# Patient Record
Sex: FEMALE | Race: WHITE | NOT HISPANIC OR LATINO | Employment: FULL TIME | URBAN - NONMETROPOLITAN AREA
[De-identification: names, ages, dates, MRNs, and addresses within clinical notes are randomized per-mention and may not be internally consistent; named-entity substitution may affect disease eponyms.]

---

## 2023-05-29 DIAGNOSIS — J45.40 MODERATE PERSISTENT ASTHMA, UNSPECIFIED WHETHER COMPLICATED (HHS-HCC): Primary | ICD-10-CM

## 2023-05-30 PROBLEM — R07.2 PRECORDIAL CATCH SYNDROME: Status: ACTIVE | Noted: 2023-05-30

## 2023-05-30 PROBLEM — R91.8 PULMONARY NODULES: Status: ACTIVE | Noted: 2023-05-30

## 2023-05-30 PROBLEM — L71.9 ROSACEA: Status: ACTIVE | Noted: 2023-05-30

## 2023-05-30 PROBLEM — R74.01 ELEVATED AST (SGOT): Status: ACTIVE | Noted: 2023-05-30

## 2023-05-30 PROBLEM — J30.1 ALLERGIC RHINITIS DUE TO POLLEN: Status: ACTIVE | Noted: 2023-05-30

## 2023-05-30 PROBLEM — J30.9 ALLERGIC RHINITIS: Status: ACTIVE | Noted: 2023-05-30

## 2023-05-30 PROBLEM — J45.909 ASTHMA (HHS-HCC): Status: ACTIVE | Noted: 2023-05-30

## 2023-05-30 PROBLEM — J30.81 ALLERGIC RHINITIS DUE TO CATS: Status: ACTIVE | Noted: 2023-05-30

## 2023-05-30 RX ORDER — FLUTICASONE PROPIONATE 50 MCG
1 SPRAY, SUSPENSION (ML) NASAL DAILY
COMMUNITY

## 2023-05-30 RX ORDER — MOMETASONE FUROATE AND FORMOTEROL FUMARATE DIHYDRATE 100; 5 UG/1; UG/1
2 AEROSOL RESPIRATORY (INHALATION)
COMMUNITY
Start: 2022-04-13 | End: 2023-05-30 | Stop reason: SDUPTHER

## 2023-05-30 RX ORDER — ALBUTEROL SULFATE 90 UG/1
1 AEROSOL, METERED RESPIRATORY (INHALATION) EVERY 4 HOURS PRN
COMMUNITY
End: 2023-10-23 | Stop reason: WASHOUT

## 2023-05-30 RX ORDER — MOMETASONE FUROATE AND FORMOTEROL FUMARATE DIHYDRATE 100; 5 UG/1; UG/1
AEROSOL RESPIRATORY (INHALATION)
Qty: 39 G | Refills: 3 | Status: SHIPPED | OUTPATIENT
Start: 2023-05-30

## 2023-08-29 ENCOUNTER — TELEPHONE (OUTPATIENT)
Dept: PRIMARY CARE | Facility: CLINIC | Age: 58
End: 2023-08-29
Payer: COMMERCIAL

## 2023-08-29 DIAGNOSIS — Z00.00 HEALTHCARE MAINTENANCE: Primary | ICD-10-CM

## 2023-10-09 ENCOUNTER — LAB (OUTPATIENT)
Dept: LAB | Facility: LAB | Age: 58
End: 2023-10-09
Payer: COMMERCIAL

## 2023-10-09 DIAGNOSIS — Z00.00 HEALTHCARE MAINTENANCE: ICD-10-CM

## 2023-10-09 LAB
ALBUMIN SERPL BCP-MCNC: 4.6 G/DL (ref 3.4–5)
ALP SERPL-CCNC: 44 U/L (ref 33–110)
ALT SERPL W P-5'-P-CCNC: 13 U/L (ref 7–45)
ANION GAP SERPL CALC-SCNC: 10 MMOL/L (ref 10–20)
AST SERPL W P-5'-P-CCNC: 48 U/L (ref 9–39)
BILIRUB SERPL-MCNC: 0.5 MG/DL (ref 0–1.2)
BUN SERPL-MCNC: 15 MG/DL (ref 6–23)
CALCIUM SERPL-MCNC: 9.7 MG/DL (ref 8.6–10.6)
CHLORIDE SERPL-SCNC: 106 MMOL/L (ref 98–107)
CHOLEST SERPL-MCNC: 173 MG/DL (ref 0–199)
CHOLESTEROL/HDL RATIO: 2.4
CO2 SERPL-SCNC: 32 MMOL/L (ref 21–32)
CREAT SERPL-MCNC: 0.82 MG/DL (ref 0.5–1.05)
ERYTHROCYTE [DISTWIDTH] IN BLOOD BY AUTOMATED COUNT: 12.1 % (ref 11.5–14.5)
GFR SERPL CREATININE-BSD FRML MDRD: 84 ML/MIN/1.73M*2
GLUCOSE SERPL-MCNC: 83 MG/DL (ref 74–99)
HCT VFR BLD AUTO: 40.9 % (ref 36–46)
HDLC SERPL-MCNC: 71.2 MG/DL
HGB BLD-MCNC: 13.2 G/DL (ref 12–16)
LDLC SERPL CALC-MCNC: 93 MG/DL (ref 140–190)
MCH RBC QN AUTO: 30.6 PG (ref 26–34)
MCHC RBC AUTO-ENTMCNC: 32.3 G/DL (ref 32–36)
MCV RBC AUTO: 95 FL (ref 80–100)
NON HDL CHOLESTEROL: 102 MG/DL (ref 0–149)
NRBC BLD-RTO: 0 /100 WBCS (ref 0–0)
PLATELET # BLD AUTO: 194 X10*3/UL (ref 150–450)
PMV BLD AUTO: 11.1 FL (ref 7.5–11.5)
POTASSIUM SERPL-SCNC: 4 MMOL/L (ref 3.5–5.3)
PROT SERPL-MCNC: 7.2 G/DL (ref 6.4–8.2)
RBC # BLD AUTO: 4.32 X10*6/UL (ref 4–5.2)
SODIUM SERPL-SCNC: 144 MMOL/L (ref 136–145)
TRIGL SERPL-MCNC: 44 MG/DL (ref 0–149)
VLDL: 9 MG/DL (ref 0–40)
WBC # BLD AUTO: 4.5 X10*3/UL (ref 4.4–11.3)

## 2023-10-09 PROCEDURE — 80053 COMPREHEN METABOLIC PANEL: CPT

## 2023-10-09 PROCEDURE — 80061 LIPID PANEL: CPT

## 2023-10-09 PROCEDURE — 85027 COMPLETE CBC AUTOMATED: CPT

## 2023-10-09 PROCEDURE — 36415 COLL VENOUS BLD VENIPUNCTURE: CPT

## 2023-10-23 ENCOUNTER — OFFICE VISIT (OUTPATIENT)
Dept: PRIMARY CARE | Facility: CLINIC | Age: 58
End: 2023-10-23
Payer: COMMERCIAL

## 2023-10-23 VITALS
HEART RATE: 59 BPM | OXYGEN SATURATION: 99 % | RESPIRATION RATE: 14 BRPM | WEIGHT: 124.2 LBS | BODY MASS INDEX: 19.96 KG/M2 | HEIGHT: 66 IN | TEMPERATURE: 98.1 F | SYSTOLIC BLOOD PRESSURE: 113 MMHG | DIASTOLIC BLOOD PRESSURE: 66 MMHG

## 2023-10-23 DIAGNOSIS — Z00.00 HEALTHCARE MAINTENANCE: Primary | ICD-10-CM

## 2023-10-23 DIAGNOSIS — J45.909 ASTHMA, UNSPECIFIED ASTHMA SEVERITY, UNSPECIFIED WHETHER COMPLICATED, UNSPECIFIED WHETHER PERSISTENT (HHS-HCC): ICD-10-CM

## 2023-10-23 DIAGNOSIS — Z12.31 ENCOUNTER FOR SCREENING MAMMOGRAM FOR MALIGNANT NEOPLASM OF BREAST: ICD-10-CM

## 2023-10-23 DIAGNOSIS — R74.01 ELEVATED AST (SGOT): ICD-10-CM

## 2023-10-23 DIAGNOSIS — M25.552 HIP PAIN, LEFT: ICD-10-CM

## 2023-10-23 DIAGNOSIS — I83.93 VARICOSE VEINS OF BOTH LOWER EXTREMITIES, UNSPECIFIED WHETHER COMPLICATED: ICD-10-CM

## 2023-10-23 DIAGNOSIS — Z23 IMMUNIZATION DUE: ICD-10-CM

## 2023-10-23 PROCEDURE — 90471 IMMUNIZATION ADMIN: CPT | Performed by: FAMILY MEDICINE

## 2023-10-23 PROCEDURE — 99213 OFFICE O/P EST LOW 20 MIN: CPT | Performed by: FAMILY MEDICINE

## 2023-10-23 PROCEDURE — 1036F TOBACCO NON-USER: CPT | Performed by: FAMILY MEDICINE

## 2023-10-23 PROCEDURE — 99396 PREV VISIT EST AGE 40-64: CPT | Performed by: FAMILY MEDICINE

## 2023-10-23 PROCEDURE — 3008F BODY MASS INDEX DOCD: CPT | Performed by: FAMILY MEDICINE

## 2023-10-23 PROCEDURE — 90686 IIV4 VACC NO PRSV 0.5 ML IM: CPT | Performed by: FAMILY MEDICINE

## 2023-10-23 RX ORDER — ALBUTEROL SULFATE 90 UG/1
2 AEROSOL, METERED RESPIRATORY (INHALATION)
COMMUNITY

## 2023-10-23 ASSESSMENT — LIFESTYLE VARIABLES: HOW OFTEN DO YOU HAVE A DRINK CONTAINING ALCOHOL: 2-4 TIMES A MONTH

## 2023-10-23 NOTE — ASSESSMENT & PLAN NOTE
Varicosities but no edema on exam.  Wears compression stockings with travel, may consider wearing on daily basis for preventing progression of varicose veins.

## 2023-10-23 NOTE — ASSESSMENT & PLAN NOTE
Her asthma has historically done well with the Dulera  She states that she has not needed the albuterol in quite some time

## 2023-10-23 NOTE — ASSESSMENT & PLAN NOTE
AST mildly elevated in past but remaining LFTs normal.  AST elevated starting in 2019 at 40, highest 50.  No fatty liver noted on prior CAT scan imaging or US gallbladder (2016).  No FMHx of liver disease  EtOH rare, infrequent.  No chronic Tylenol usage.    10/2023 AST 48  11/2022 AST 47   12/2021 AST 50  09/2020 AST 40  05/2019 AST 40    Patient reassured that AST is a serum test and can come from other sources than just the liver. At this level of very mild elevation that has been stable for years now and prior imaging that showed liver normal this is not concerning, patient reassured. Will continue to monitor CMP annually.

## 2023-10-23 NOTE — PROGRESS NOTES
Subjective   Patient ID: Fatimah Arriola is a 58 y.o. female who presents for Annual Exam (Pt presents for annual physical exam- ABN given to pt and signed, pt verbalized understanding. Pt states that she would like to discuss her AST. Stiffness in her left hip pain down into her upper thigh sitting, laying, for a long times- pt states that this has been going on for about 6 months or more).    HPI     Patient presents today for annual physical.    Patient states that she would like to discuss her AST, see below.    She would also like to discuss stiffness in her left hip pain radiating down into her upper thigh. Occurs with sitting and laying for prolonged periods. Describes as heaviness. Occasionally has pain with walking. Ongoing for 6+ months. States she has slowed down exercising due to discomfort, cannot lay or sleep on left side in bed. Every once and while has pain free day. Home treatments include stretching, disinclined to take medications. She denies any prior trauma or injury.    WELLNESS VISIT   TDAP: 2019  SHINGRIX: none found - DUE  PNEUMOVAX: N/A  PAP: 3/2023 (ASCUS negative, HPV negative) - done via GYN (Dr. Pryor)  MAMMO: 10/2022 (FMHX of sister (dx 56 y/o), cousin, m. Grandmother)  CSCOPE: 10/2017 (no specimens, diverticulosis, hemorrhoids, good prep) (No FMHx)  DEXA: N/A  HEP C SCREEN: 10/2017 negative  CACS: 0 (low) in 2/2020   LIPID: 10/2023 TC/HDL 2.4, HDL 71, LDL 93, TRIG 44    Diet: overall balanced    Exercise: 3 days per week, walking x 1 day, bands x 1 day, weights x 1 day    Alcohol use: sometimes, may be 1 beer or glass of wine per week    Smoking: never smoker    Dental visits: UTD  Vision screening: UTD    Patient is agreeable to influenza vaccine.    Cervical cancer screening: UTD via GYN - March 2023 negative.  Post-menopausal, onset of menopause age 53.  No history of abnormal pap smears.  Denies family history in first degree relative.  Denies pelvic pain, vaginal discharge, or  "vaginal bleeding.    Breast cancer screening: DUE  Reports family history in first degree relative and multiple second degree relatives, sister, cousin, m. Grandmother.  Denies lumps/bumps, skin changes, nipple retraction, or nipple drainage.    Colon cancer screening: UTD  Denies family history in first degree relative.  Denies melena, hematochezia, constipation, diarrhea, bloating, change in bowel habits.    ROUTINE VISIT  CHRONIC CONDITIONS:   -Asthma   Her asthma has historically done well with the Dulera  She states that she has not needed the albuterol in quite some time    -Lung nodules  Pulmonary nodularity stable CT imaging 2/2020 - 10/2020  Evaluated by pulmonology, felt to nonspecific and benign in nature probably related to past infection  Patient reported having had \"13\"pneumonias over the years when she was younger in relationship to her asthma.   Patient nonsmoker but did have secondhand smoke her parents growing up.     -Elevated LFTs  AST mildly elevated in past but remaining LFTs normal.  AST elevated starting in 2019 at 40, highest 50.  No fatty liver noted on prior CT chest imaging.  No FMHx of liver disease  EtOH intake rare, infrequent.    10/2023 AST 48  11/2022 AST 47   12/2021 AST 50  09/2020 AST 40  05/2019 AST 40     -Varicose veins  Wears compression stockings when traveling but not on regular bases.        Review of Systems   All other systems reviewed and are negative.      Objective   /66 (BP Location: Right arm, Patient Position: Sitting, BP Cuff Size: Small adult)   Pulse 59   Temp 36.7 °C (98.1 °F) (Temporal)   Resp 14   Ht 1.676 m (5' 6\")   Wt 56.3 kg (124 lb 3.2 oz)   SpO2 99%   BMI 20.05 kg/m²     Physical Exam  Vitals and nursing note reviewed.   Constitutional:       General: She is not in acute distress.     Appearance: Normal appearance. She is not toxic-appearing.   HENT:      Head: Normocephalic and atraumatic.   Eyes:      Extraocular Movements: Extraocular " movements intact.      Pupils: Pupils are equal, round, and reactive to light.   Neck:      Thyroid: No thyromegaly.      Vascular: No hepatojugular reflux or JVD.   Cardiovascular:      Rate and Rhythm: Normal rate and regular rhythm.      Heart sounds: No murmur heard.     No friction rub. No gallop.   Pulmonary:      Effort: Pulmonary effort is normal.      Breath sounds: Normal breath sounds. No wheezing, rhonchi or rales.   Abdominal:      General: Bowel sounds are normal. There is no distension.      Palpations: Abdomen is soft. There is no mass.      Tenderness: There is no abdominal tenderness. There is no guarding.   Musculoskeletal:      Right lower leg: No edema (few varicosities).      Left lower leg: No edema (few varicosities).      Comments:   Gait normal  Strength 5/5 bilateral hip flexors  DTRs 2/4 x bilateral lower extremities  ASIS left lower than right  TTP of left greater trochanteric bursa  ROM normal bilaterally with external and rotation of hips.  Mild discomfort with extreme internal rotation of L hip   Lymphadenopathy:      Cervical: No cervical adenopathy.   Neurological:      General: No focal deficit present.      Mental Status: She is alert and oriented to person, place, and time.      Gait: Gait normal.      Deep Tendon Reflexes: Reflexes are normal and symmetric. Reflexes normal.   Psychiatric:         Mood and Affect: Mood normal.         Behavior: Behavior normal.         Assessment/Plan   Problem List Items Addressed This Visit             ICD-10-CM    Asthma J45.909     Her asthma has historically done well with the Dulera  She states that she has not needed the albuterol in quite some time         Elevated AST (SGOT) R74.01     AST mildly elevated in past but remaining LFTs normal.  AST elevated starting in 2019 at 40, highest 50.  No fatty liver noted on prior CAT scan imaging or US gallbladder (2016).  No FMHx of liver disease  EtOH rare, infrequent.  No chronic Tylenol  usage.    10/2023 AST 48  11/2022 AST 47   12/2021 AST 50  09/2020 AST 40  05/2019 AST 40    Patient reassured that AST is a serum test and can come from other sources than just the liver. At this level of very mild elevation that has been stable for years now and prior imaging that showed liver normal this is not concerning, patient reassured. Will continue to monitor CMP annually.         Healthcare maintenance - Primary Z00.00     Vaccines and screenings reviewed.  Questionnaires completed.  Health and wellness topics reviewed.  Diet and exercise recommendations revisited.  Routine blood work reviewed today.     VACCINES  Flu vaccine administered today.    Shingles vaccine (Shingrix) is recommended. Please call insurance to see if covered. This vaccine is expensive but extremely effective. This is to be administered in 2 separate doses, 2- 6 months apart. This is a killed virus vaccine, so no risk of chicken pox or shingles with administration. The vaccine is approximately 90 % effective to protect against shingles even 5 years out. Side effects of the vaccine can include soreness of the arm at administration site and possible flu-like symptoms after administration. This is a strongly recommended vaccine.     SCREENINGS:  Screening pap smear UTD, last completed 3/2023 via GYN.  Screening mammogram DUE, last completed 10/2022, ordered today.  Screening colonoscopy UTD, last completed 2017, due 2027.  Screening DEXA recommended starting at age 65.    LIFESTYLE MEASURES  -make sure you are avoiding refined carbs such as breads, pasta, cereal, candy, soda,  nutrition bars, granola, chips, and sugar sweetened beverages.      -eat 5- 7 servings daily of veggies,  healthy protein such as chicken, fish,  beans, and eggs, and include healthy fats in your diet such as seeds, nuts, olive oil, avocados, and salmon.   -exercise 4 - 6 days per week as you are able, 150 minutes total weekly divided up is recommended.  -Vitamin  D is recommended at 1000 - 5000 IU international units daily.   -Always wear sunscreen when you have sun exposure.  -64 oz of water is recommended daily.  -Dental visits recommended every 6 months.  -Eye exam recommended every 2 years, for those with vision problems every year.           Hip pain, left M25.552     I recommend physical therapy. PT referral placed today.  Doing core muscle strengthening exercises and continuing them lifelong can decrease your risk of re-injury, as well as help with acute symptoms.      Exam is reassuring in the office today.   X-ray is not warranted at this time.  However if not improving as expected with PT would consider imaging.    Patient may use heat or cold, whichever provides greater relief.    Patient can use ibuprofen or Aleve as instructed on the bottle, unless unable to take (allergy, recent bleeding in stomach, decreased kidney function)  . Make sure you take those after food, risk of stomach upset, GI bleeding, etc. increased if taken on an empty stomach.    Know that there is an extremely small but real risk of heart or stroke with use of these medicines, particularly if a person has cardiac risk factors such as high blood pressure, cholesterol, diabetes.      Tylenol can safely be used at doses up to 500 mg 2 tabs 3 times daily, unless liver function is decreased.      Try Epsom salt soaks to see if this improves pain and muscle tightness.          Varicose veins of both lower extremities I83.93     Varicosities but no edema on exam.  Wears compression stockings with travel, may consider wearing on daily basis for preventing progression of varicose veins.          Other Visit Diagnoses         Codes    Body mass index (BMI) of 20.0 to 20.9 in adult     Z68.20    Immunization due     Z23    Relevant Orders    Flu vaccine (IIV4) age 6 months and greater, preservative free (Completed)    Encounter for screening mammogram for malignant neoplasm of breast     Z12.31             Follow-up in 8-12 weeks for recheck L hip pain. Can cancel if improved.    Follow-up in 1 year for routine care + CPE  Labs to be done prior to next physical.  Call for sooner follow-up if needed.     Scribe Attestation  By signing my name below, IMajo Scribe   attest that this documentation has been prepared under the direction and in the presence of Minda Hutson DO.

## 2023-10-23 NOTE — ASSESSMENT & PLAN NOTE
Vaccines and screenings reviewed.  Questionnaires completed.  Health and wellness topics reviewed.  Diet and exercise recommendations revisited.  Routine blood work reviewed today.     VACCINES  Flu vaccine administered today.    Shingles vaccine (Shingrix) is recommended. Please call insurance to see if covered. This vaccine is expensive but extremely effective. This is to be administered in 2 separate doses, 2- 6 months apart. This is a killed virus vaccine, so no risk of chicken pox or shingles with administration. The vaccine is approximately 90 % effective to protect against shingles even 5 years out. Side effects of the vaccine can include soreness of the arm at administration site and possible flu-like symptoms after administration. This is a strongly recommended vaccine.     SCREENINGS:  Screening pap smear UTD, last completed 3/2023 via GYN.  Screening mammogram DUE, last completed 10/2022, ordered today.  Screening colonoscopy UTD, last completed 2017, due 2027.  Screening DEXA recommended starting at age 65.    LIFESTYLE MEASURES  -make sure you are avoiding refined carbs such as breads, pasta, cereal, candy, soda,  nutrition bars, granola, chips, and sugar sweetened beverages.      -eat 5- 7 servings daily of veggies,  healthy protein such as chicken, fish,  beans, and eggs, and include healthy fats in your diet such as seeds, nuts, olive oil, avocados, and salmon.   -exercise 4 - 6 days per week as you are able, 150 minutes total weekly divided up is recommended.  -Vitamin D is recommended at 1000 - 5000 IU international units daily.   -Always wear sunscreen when you have sun exposure.  -64 oz of water is recommended daily.  -Dental visits recommended every 6 months.  -Eye exam recommended every 2 years, for those with vision problems every year.

## 2023-10-23 NOTE — ASSESSMENT & PLAN NOTE
I recommend physical therapy. PT referral placed today.  Doing core muscle strengthening exercises and continuing them lifelong can decrease your risk of re-injury, as well as help with acute symptoms.      Exam is reassuring in the office today.   X-ray is not warranted at this time.  However if not improving as expected with PT would consider imaging.    Patient may use heat or cold, whichever provides greater relief.    Patient can use ibuprofen or Aleve as instructed on the bottle, unless unable to take (allergy, recent bleeding in stomach, decreased kidney function)  . Make sure you take those after food, risk of stomach upset, GI bleeding, etc. increased if taken on an empty stomach.    Know that there is an extremely small but real risk of heart or stroke with use of these medicines, particularly if a person has cardiac risk factors such as high blood pressure, cholesterol, diabetes.      Tylenol can safely be used at doses up to 500 mg 2 tabs 3 times daily, unless liver function is decreased.      Try Epsom salt soaks to see if this improves pain and muscle tightness.

## 2023-10-23 NOTE — PATIENT INSTRUCTIONS
Supplements for inflammation    Can consider use of the anti-oxidant / inflammation reducing properties of the following supplements:    Turmeric (curcumin )  500 - 1000 mg daily    ASTAXANTHIN (similar to beta-carotine)   Take anywhere from 5-18 mg daily for 3 months.     These can be purchased at health stores such as [employer] or on [employer].         Repeat this 3 month cycle twice a year.     Recommend range of motion exercises such as yoga

## 2023-10-25 ENCOUNTER — EVALUATION (OUTPATIENT)
Dept: PHYSICAL THERAPY | Facility: CLINIC | Age: 58
End: 2023-10-25
Payer: COMMERCIAL

## 2023-10-25 DIAGNOSIS — M25.552 HIP PAIN, LEFT: ICD-10-CM

## 2023-10-25 PROCEDURE — 97161 PT EVAL LOW COMPLEX 20 MIN: CPT | Mod: GP | Performed by: PHYSICAL THERAPIST

## 2023-10-25 ASSESSMENT — ENCOUNTER SYMPTOMS
DEPRESSION: 0
OCCASIONAL FEELINGS OF UNSTEADINESS: 0
LOSS OF SENSATION IN FEET: 0

## 2023-10-25 ASSESSMENT — PATIENT HEALTH QUESTIONNAIRE - PHQ9
1. LITTLE INTEREST OR PLEASURE IN DOING THINGS: NOT AT ALL
SUM OF ALL RESPONSES TO PHQ9 QUESTIONS 1 AND 2: 0
2. FEELING DOWN, DEPRESSED OR HOPELESS: NOT AT ALL

## 2023-10-25 NOTE — Clinical Note
October 25, 2023     Patient: Fatimah Arriola   YOB: 1965   Date of Visit: 10/25/2023       To Whom it May Concern:    Fatimah Arriola was seen in my clinic on 10/25/2023. She {Return to school/sport:84521}.    If you have any questions or concerns, please don't hesitate to call.         Sincerely,          Becky Drake, PT        CC: No Recipients

## 2023-10-25 NOTE — Clinical Note
October 25, 2023     Patient: Fatimah Arriola   YOB: 1965   Date of Visit: 10/25/2023       To Whom It May Concern:    It is my medical opinion that Fatimah Arriola {Work release (duty restriction):21052}.    If you have any questions or concerns, please don't hesitate to call.         Sincerely,        Becky Drake, PT    CC: No Recipients

## 2023-10-25 NOTE — PROGRESS NOTES
Physical Therapy Evaluation      Patient Name: Fatimah Arriola  MRN: [id number]  Today's Date: 10/25/2023  Visit: 1/mn  Referred by:  Minda Hutson  Time Calculation  Start Time: 0709  Stop Time: 0750  Time Calculation (min): 41 min  Diagnosis:   1. Hip pain, left  Referral to Physical Therapy                     PRECAUTIONS:    none    PMH  Denies hx of LB problems    PHI  She dev L hip pain in April of 2023 without known incident but she did start exercising more at that time.  She noted her L hip was getting stiffer and stiffer and it was difficult to get up from the floor.  She saw her MD.  No imaging done at this time.  She was referred to PT.    SUBJECTIVE  Today she reports pain in the L glut area. Lat L hip and L quad area.  She rates the pain 3-4/10 and it is described as stiff and achy.  She doesn't note any loss of hip motion.  The pain worsens with IR. L sidelying and valgus positions.  If she is on her L side her entire anterior L LE with go numb.  It eases with stretching and changing position.  functionally she is limited in transferring sit to stand, standing more than 30 min to clean her kitchen or prepare a meal and sleeping.    The patient's is living with her  in a 2 story home.    Their goals for therapy are to reduce pain.    OBJECTIVE  Observation:  Note R rotation of spine with a levoscoliosis of the thoracic spine causing the R scap to be lower.    Palpation:  Iliac crests level in standing and supine.  ASIS also level (B).    AROM:  -Lumbar AROM flex = WNL, repeat flex = WNL, ext = WNL, SB = 0 to R and 50% to L, rot = WFL (B)  - L hip AROM flex = 108, abd = 18, ER/IR = WFL    Myotomes:  - L2-S2 = 5/5 (B)    MMT:  - L hip flex = 5/5, ext = 5/5, ER/IR = 4/5, add = 5/5, abd = 4/5 with pain  - L glut min = 4/5, med = 4-/5    Neuro:  - Reflex L3 =  2/4 (B)  S1 =  2/4 (B)  - Clonus =  neg (B)   Babinski = neg (B)    Special Tests:  - Negative slump, (B) SLR, PA L1-5  - Negative L  hip FADDIR, scour  - Positive L hip MEHNAZ and piriformis     Joint Mobility:  - L hip = WFL all dir    Outcome Measure:  LEFS = 72    ASSESSMENT  Patient presents to physical therapy with c/o L hip pain and stiffness.  No significant hip symptoms reproduced with exam today.  However, note levoscoliosis of the thoracic spine that causes R rot of the spine.  This may be influencing the mechanics of the hip causing the hip stiffness.    PLAN  Continue therapy 1x/wk for 6 wks for hip flexibility, core stab and postural exercises to improve scoliosis and mechanics.    Goals  1.  she will be independent with her HEP  2.  demonstrate decreased stiffness by having a negative MEHNAZ and piriformis test  3.  demonstrate less R rotation of the spine to improve spine/hip mechanics      TODAY'S TREATMENT  - evaluation of the low back and L hip completed.  - she was educated on her dx and given a HEP as seen below:    Access Code: [email or web address]  URL: [email or web address]  Date: 10/25/2023  Prepared by: Becky Drake    Program Notes  do Quadruped thoracic rotation to the left    Exercises  - Supine Piriformis Stretch with Leg Straight  - 1 x daily - 7 x weekly - 3 sets - 30 hold  - Supine Single Knee to Chest Stretch  - 1 x daily - 7 x weekly - 3 sets - 30 hold  - Seated Hamstring Stretch  - 1 x daily - 7 x weekly - 3 sets - 30 hold  - Quadruped Thoracic Rotation Full Range with Hand on Neck  - 1 x daily - 7 x weekly - 3 sets - 10 reps

## 2023-11-01 ENCOUNTER — APPOINTMENT (OUTPATIENT)
Dept: RADIOLOGY | Facility: CLINIC | Age: 58
End: 2023-11-01
Payer: COMMERCIAL

## 2023-11-06 ENCOUNTER — TREATMENT (OUTPATIENT)
Dept: PHYSICAL THERAPY | Facility: CLINIC | Age: 58
End: 2023-11-06
Payer: COMMERCIAL

## 2023-11-06 DIAGNOSIS — M25.552 HIP PAIN, LEFT: Primary | ICD-10-CM

## 2023-11-06 PROCEDURE — 97140 MANUAL THERAPY 1/> REGIONS: CPT | Mod: GP | Performed by: PHYSICAL THERAPIST

## 2023-11-06 PROCEDURE — 97110 THERAPEUTIC EXERCISES: CPT | Mod: GP | Performed by: PHYSICAL THERAPIST

## 2023-11-06 NOTE — PROGRESS NOTES
Physical Therapy Treatment    Patient Name: Fatimah Arriola  MRN: [id number]  Today's Date: 11/6/2023   Visit 2/mn  Referred by:  Minda Hutson  Time Calculation  Start Time: 0748  Stop Time: 0834  Time Calculation (min): 46 min  Diagnosis:   1. Hip pain, left              SUBJECTIVE:  She reports stiffness accompanied with mild pain when getting up from a chair.  It is located at the L glut and ITB area.  She does have popping at her hips at times.    HEP compliance: yes    OBJECTIVE:  - she is able to maintain a level pelvic position in a SLS position    Treatment:  - initiated exercise for L hip strength and stability.  Therapeutic Exercise  Therapeutic Exercise Activity 1: S/L hip abd, L, 3x12  Therapeutic Exercise Activity 2: S/L clamshell, L, 3x12  Therapeutic Exercise Activity 3: bridge, 3x12  Therapeutic Exercise Activity 4: SLS L, 30 sec x3  Therapeutic Exercise Activity 5: squat, 3x12  Therapeutic Exercise Activity 6: L ITB stretch, 30 sec x3  Therapeutic Exercise Activity 7: NuStep L1, 5 min  Therapeutic Exercise Activity 8: Step up, L, 4 in, 3x12  Manual Therapy  Manual Therapy Activity 1: PROM L hip into ER, abd and ext           - her HEP was updated as seen below.  She was instructed to add these exercises to her program on 11/8/23 if she tolerated today's session without symptoms occurring today or tomorrow.    Access Code: [email or web address]  URL: [email or web address]  Date: 11/06/2023  Prepared by: Becky Drake    Exercises  - Supine Bridge  - 1 x daily - 7 x weekly - 3 sets - 10 reps  - Clamshell  - 1 x daily - 7 x weekly - 3 sets - 10 reps  - Sidelying Hip Abduction  - 1 x daily - 7 x weekly - 3 sets - 10 reps  - Squat with Chair Touch  - 1 x daily - 7 x weekly - 3 sets - 10 reps  - Step Up  - 1 x daily - 7 x weekly - 3 sets - 10 reps    ASSESSMENT:  She continues to present to PT essentially pain free.  Good tolerance of all exercises without pain.  She will benefit from  continued therapy to monitor her response to exercise and to learn exercises for her to continue independently as she is essentially pain free at this point.    PLAN:  Continue with hip strength and flexibility exercises.  She may continue independently with a HEP at that time depending on her progress.

## 2023-11-09 ENCOUNTER — ANCILLARY PROCEDURE (OUTPATIENT)
Dept: RADIOLOGY | Facility: CLINIC | Age: 58
End: 2023-11-09
Payer: COMMERCIAL

## 2023-11-09 DIAGNOSIS — Z12.31 ENCOUNTER FOR SCREENING MAMMOGRAM FOR MALIGNANT NEOPLASM OF BREAST: ICD-10-CM

## 2023-11-09 PROCEDURE — 77067 SCR MAMMO BI INCL CAD: CPT | Performed by: RADIOLOGY

## 2023-11-09 PROCEDURE — 77063 BREAST TOMOSYNTHESIS BI: CPT

## 2023-11-09 PROCEDURE — 77063 BREAST TOMOSYNTHESIS BI: CPT | Performed by: RADIOLOGY

## 2023-11-13 ENCOUNTER — TREATMENT (OUTPATIENT)
Dept: PHYSICAL THERAPY | Facility: CLINIC | Age: 58
End: 2023-11-13
Payer: COMMERCIAL

## 2023-11-13 DIAGNOSIS — M25.552 HIP PAIN, LEFT: Primary | ICD-10-CM

## 2023-11-13 PROCEDURE — 97110 THERAPEUTIC EXERCISES: CPT | Mod: GP | Performed by: PHYSICAL THERAPIST

## 2023-11-13 NOTE — PROGRESS NOTES
Physical Therapy Treatment    Patient Name: Fatimah Arriola  MRN: [id number]  Today's Date: 11/13/2023   Visit 3/mn  Referred by:   Minda Hutson  Time Calculation  Start Time: 0749  Stop Time: 0835  Time Calculation (min): 46 min  Diagnosis:   1. Hip pain, left              SUBJECTIVE:  0/10 L hip pain.  The popping has reduced and generally only occurs with doing leg lifts.  She did well after last session but did feel a little tight the next day - no pain however,  HEP compliance: yes    OBJECTIVE:  - MMT glut min = 4+/5, med = 4-/5  - Negative MEHNAZ and piriformis    Treatment:  - continued with exercise for strength and stability of L hip.  Advanced SLS on airex and changed from step up to step down.  Added multi-hip machine abd and ext.  Therapeutic Exercise  Therapeutic Exercise Activity 1: multip hip abd/ext, (B), L3, 3x10  Therapeutic Exercise Activity 3: bridge, 3x12  Therapeutic Exercise Activity 4: SLS L on airex, 30 sec x3  Therapeutic Exercise Activity 5: squat, 3x12  Therapeutic Exercise Activity 6: L ITB stretch, 30 sec x3  Therapeutic Exercise Activity 7: NuStep L1, 5 min  Therapeutic Exercise Activity 8: Step down, L, 4 in, 2x12                ASSESSMENT:  Good tolerance of exercise.  She did report a second of pain when stopping the L SLS on airex exercise, otherwise no problem.  She demonstrates a negative piriformis and MEHNAZ test which was a goal I had set at the start of her rx.  She prefers to continue PT at this time to continue learning new exercises.  Probable discharge at next session.    PLAN:  Add katie

## 2023-11-22 ENCOUNTER — APPOINTMENT (OUTPATIENT)
Dept: PHYSICAL THERAPY | Facility: CLINIC | Age: 58
End: 2023-11-22
Payer: COMMERCIAL

## 2023-11-27 ENCOUNTER — TREATMENT (OUTPATIENT)
Dept: PHYSICAL THERAPY | Facility: CLINIC | Age: 58
End: 2023-11-27
Payer: COMMERCIAL

## 2023-11-27 DIAGNOSIS — M25.552 HIP PAIN, LEFT: Primary | ICD-10-CM

## 2023-11-27 PROCEDURE — 97110 THERAPEUTIC EXERCISES: CPT | Mod: GP | Performed by: PHYSICAL THERAPIST

## 2023-11-27 NOTE — PROGRESS NOTES
Physical Therapy Treatment    Patient Name: Fatimah Arriola  MRN: [id number]  Today's Date: 11/27/2023   Visit 4/mn  Referred by:   Minda Hutson  Time Calculation  Start Time: 0750  Stop Time: 0837  Time Calculation (min): 47 min  Diagnosis:   1. Hip pain, left              SUBJECTIVE:  She reported some pain last week at the lateral L hip rated 3/10.  She is a little sore now (1/10).  She did well after more advanced exercises at her last session (multi hip machine).    HEP compliance: partially    OBJECTIVE:  - Negative FADDIR  - Positive MEHNAZ    Treatment:  - continued with exercise for L hip strength.  Added lunges.  Advanced clamshells to green band.  Therapeutic Exercise  Therapeutic Exercise Activity 1: multip hip abd/ext, (B), L3, 3x10  Therapeutic Exercise Activity 2: S/L clamshell, L, GN TB,  3x12  Therapeutic Exercise Activity 3: bridge, 3x12  Therapeutic Exercise Activity 4: SLS L on airex, 30 sec x3  Therapeutic Exercise Activity 5: squat, 3x12  Therapeutic Exercise Activity 7: NuStep L1, 5 min  Therapeutic Exercise Activity 8: Step down, L, 4 in, 2x12  Therapeutic Exercise Activity 9: lunge, B, 2x10  Manual Therapy  Manual Therapy Activity 1: PROM of L hip int flex, ext, IR and ER             ASSESSMENT:  Good tolerance of new exercise - no pain with any activity.  MEHNAZ is still painful.  She will benefit from continued exercise to strenghten the L hip.    PLAN:  Probable discharge at next session.

## 2023-12-04 ENCOUNTER — TREATMENT (OUTPATIENT)
Dept: PHYSICAL THERAPY | Facility: CLINIC | Age: 58
End: 2023-12-04
Payer: COMMERCIAL

## 2023-12-04 DIAGNOSIS — M25.552 HIP PAIN, LEFT: Primary | ICD-10-CM

## 2023-12-04 PROCEDURE — 97110 THERAPEUTIC EXERCISES: CPT | Mod: GP | Performed by: PHYSICAL THERAPIST

## 2023-12-04 NOTE — PROGRESS NOTES
Physical Therapy Treatment/Discharge    Patient Name: Fatimah Arriola  MRN: [id number]  Today's Date: 12/4/2023   Visit 5/mn  Referred by:   Minda Hutson  Time Calculation  Start Time: 0752  Stop Time: 0833  Time Calculation (min): 41 min  Diagnosis:   1. Hip pain, left              SUBJECTIVE:  0/10 pain.  Has not really had pain for a week.  She is not limited in any ADLS.  HEP compliance: partially    OBJECTIVE:  - continue to note levoscoliosis with slight R rot of spine.  - L hip AROM flex = 112, abd = 25  -  MMT L hip flex/ext = 4+/5, add = 5/5, abd = 4+/5, IR/ER = 4+/5  - Negative MEHNAZ and piriformis  - MMT L glut min = 4+/5, med = 4/5  - LEFS = 77 (72 at eval)    Treatment:  - continued with hip strengthening exercise.  Therapeutic Exercise  Therapeutic Exercise Activity 1: multip hip abd/ext, (B), L3, 3x10  Therapeutic Exercise Activity 7: NuStep L1, 5 min  Therapeutic Exercise Activity 8: Step down, L, 4 in, 3x12  Therapeutic Exercise Activity 9: lunge, B, 2x10              - she was given a HEP to continue independently as seen below:    Access Code: [email or web address]  URL: [email or web address]  Date: 12/04/2023  Prepared by: Becky Drake    Exercises  - Sidelying Hip Abduction  - 1 x daily - 4 x weekly - 3 sets - 10 reps  - Supine Bridge  - 1 x daily - 4 x weekly - 3 sets - 10 reps  - Clamshell  - 1 x daily - 4 x weekly - 3 sets - 10 reps  - Prone Hip Extension  - 1 x daily - 4 x weekly - 3 sets - 10 reps  - Squat with Chair Touch  - 1 x daily - 4 x weekly - 3 sets - 10 reps  - Standard Lunge  - 1 x daily - 4 x weekly - 3 sets - 10 reps  - Forward Step Down  - 1 x daily - 4 x weekly - 3 sets - 10 reps    ASSESSMENT:  She has progressed well in physical therapy and presents without pain or limitations in ADLS.  She demonstrates increased strength and has negative objective testing now.  She is able to continue independently with a HEP at this time.    PLAN:  Discharge to [facility]

## 2023-12-25 ENCOUNTER — TELEPHONE (OUTPATIENT)
Dept: PRIMARY CARE | Facility: CLINIC | Age: 58
End: 2023-12-25
Payer: COMMERCIAL

## 2023-12-26 NOTE — TELEPHONE ENCOUNTER
Received outside records from gyn pap 3-7-23     Negative pap     Positive high risk hpv    If pt is already following with gyn, keep up as scheduled     If not,  I would place a referral to make sure no additional testing is needed based on high risk HPV type identified     Pls let me know     Happy to place referral for gyn

## 2024-01-08 ENCOUNTER — APPOINTMENT (OUTPATIENT)
Dept: PRIMARY CARE | Facility: CLINIC | Age: 59
End: 2024-01-08
Payer: COMMERCIAL

## 2024-04-27 ENCOUNTER — LAB REQUISITION (OUTPATIENT)
Dept: LAB | Facility: HOSPITAL | Age: 59
End: 2024-04-27
Payer: COMMERCIAL

## 2024-04-27 DIAGNOSIS — N39.0 URINARY TRACT INFECTION, SITE NOT SPECIFIED: ICD-10-CM

## 2024-04-27 PROCEDURE — 87086 URINE CULTURE/COLONY COUNT: CPT

## 2024-04-29 LAB — BACTERIA UR CULT: NORMAL

## 2024-07-24 ENCOUNTER — TELEPHONE (OUTPATIENT)
Dept: PRIMARY CARE | Facility: CLINIC | Age: 59
End: 2024-07-24
Payer: COMMERCIAL

## 2024-07-24 NOTE — TELEPHONE ENCOUNTER
Patient called back, stated she will see another provider    Scheduled for Monday with Dr. Nolasco

## 2024-07-24 NOTE — TELEPHONE ENCOUNTER
Patient was seen in ER for allergic reaction to antibiotic (prescribed by dermatology for a cyst on her breast- has follow up tomorrow with them for cyst)    She has questions for you regarding this, no available openings for a hospital follow up within a month    Declined seeing another PCP here for this

## 2024-07-29 ENCOUNTER — APPOINTMENT (OUTPATIENT)
Dept: PRIMARY CARE | Facility: CLINIC | Age: 59
End: 2024-07-29
Payer: COMMERCIAL

## 2024-07-29 VITALS
BODY MASS INDEX: 19.92 KG/M2 | DIASTOLIC BLOOD PRESSURE: 72 MMHG | SYSTOLIC BLOOD PRESSURE: 116 MMHG | WEIGHT: 123.4 LBS | TEMPERATURE: 98.7 F | OXYGEN SATURATION: 98 % | HEART RATE: 59 BPM

## 2024-07-29 DIAGNOSIS — Z87.2 HX OF URTICARIA: ICD-10-CM

## 2024-07-29 DIAGNOSIS — N60.81 SEBACEOUS CYST OF SKIN OF RIGHT BREAST: Primary | ICD-10-CM

## 2024-07-29 DIAGNOSIS — M25.50 ACUTE JOINT PAIN: ICD-10-CM

## 2024-07-29 DIAGNOSIS — T88.7XXA MEDICATION SIDE EFFECT: ICD-10-CM

## 2024-07-29 PROCEDURE — 99214 OFFICE O/P EST MOD 30 MIN: CPT | Performed by: FAMILY MEDICINE

## 2024-07-29 RX ORDER — CLINDAMYCIN HYDROCHLORIDE 300 MG/1
CAPSULE ORAL
COMMUNITY
Start: 2024-07-25

## 2024-07-29 RX ORDER — CETIRIZINE HYDROCHLORIDE 10 MG/1
10 TABLET ORAL
COMMUNITY
Start: 2024-07-24 | End: 2024-07-29 | Stop reason: ALTCHOICE

## 2024-07-29 RX ORDER — FAMOTIDINE 20 MG/1
20 TABLET, FILM COATED ORAL 2 TIMES DAILY
COMMUNITY
Start: 2024-07-24 | End: 2024-07-29

## 2024-07-29 RX ORDER — SULFAMETHOXAZOLE AND TRIMETHOPRIM 800; 160 MG/1; MG/1
1 TABLET ORAL
COMMUNITY
Start: 2024-07-23 | End: 2024-07-29 | Stop reason: SINTOL

## 2024-07-29 RX ORDER — EPINEPHRINE 0.3 MG/.3ML
0.3 INJECTION SUBCUTANEOUS
COMMUNITY
Start: 2024-07-24

## 2024-07-29 RX ORDER — METHYLPREDNISOLONE 4 MG/1
TABLET ORAL
COMMUNITY
Start: 2024-07-24 | End: 2024-07-29

## 2024-07-29 NOTE — PROGRESS NOTES
Subjective   Patient ID: Fatimah Arriola is a 58 y.o. female who presents for Hospital Follow-up.  HPI  Infection , chest wall cyst  , right breast  .  2 yrs was present as a hard lump , thumbnail size   Enlarged suddenly and very very painful ;  went to [facility] - 7/14/2024   Went to see her gynecologist   7/17/24 for it  .      Referred to surgery     Bactrim 7/14/24 -- 7/19/24   Doxycycline 7/22 , 7/23 Tuesday 7/ 23 got sick with sudden terrible joint pain . Bilateral hips and bilateral knees ,  and red bump son legs , which spread and became Hives       Surgeon visit 7/25    - last mammogram 11/2023   - Cyst removed from head, 5-6 yrs ago .   - appt with breast doctor on Monday .        Bactrim 7/14/24 -- 7/19/24   Doxycycline 7/22 , 7/23     Last took any antibiotics on Weds morning , took Bactrim      Review of Systems    Objective   /72 (BP Location: Left arm, Patient Position: Sitting, BP Cuff Size: Adult)   Pulse 59   Temp 37.1 °C (98.7 °F) (Temporal)   Wt 56 kg (123 lb 6.4 oz)   SpO2 98%   BMI 19.92 kg/m²     Physical Exam  Alert. No distress  1 cm oval red. Mildly tender subcut mass, rt breast     Assessment/Plan   Problem List Items Addressed This Visit    None  Visit Diagnoses       Sebaceous cyst of skin of right breast    -  Primary    Acute joint pain        Hx of urticaria        Medication side effect                Seb cyst   Improved per pt report , the last 6 days on No Antibiotics    Continue to monitor.  Very nervous to start the prescribed Clindamycin   Re examination 1 week to 10 days .     Med reaction , unknown etiology , was on 2 antibx at the time of hives  -states has appt with allergist coming up    Sudden arthralgia , unclear etiology  -no sxs at all now . To notify us if it recurs       ELI Nolasco MD

## 2024-08-07 ENCOUNTER — APPOINTMENT (OUTPATIENT)
Dept: PRIMARY CARE | Facility: CLINIC | Age: 59
End: 2024-08-07
Payer: COMMERCIAL

## 2024-09-04 ENCOUNTER — APPOINTMENT (OUTPATIENT)
Dept: PRIMARY CARE | Facility: CLINIC | Age: 59
End: 2024-09-04
Payer: COMMERCIAL

## 2024-09-04 VITALS
WEIGHT: 126.1 LBS | HEART RATE: 68 BPM | SYSTOLIC BLOOD PRESSURE: 103 MMHG | RESPIRATION RATE: 12 BRPM | BODY MASS INDEX: 20.35 KG/M2 | OXYGEN SATURATION: 100 % | TEMPERATURE: 98 F | DIASTOLIC BLOOD PRESSURE: 50 MMHG

## 2024-09-04 DIAGNOSIS — N60.81 SEBACEOUS CYST OF SKIN OF RIGHT BREAST: Primary | ICD-10-CM

## 2024-09-04 PROCEDURE — 1036F TOBACCO NON-USER: CPT | Performed by: FAMILY MEDICINE

## 2024-09-04 PROCEDURE — 99213 OFFICE O/P EST LOW 20 MIN: CPT | Performed by: FAMILY MEDICINE

## 2024-09-04 NOTE — PROGRESS NOTES
Subjective   Patient ID: Fatimah Arriola is a 58 y.o. female who presents for Follow-up (Cyst on R breast).  HPI      Last visit breast exam :  7/29/24  with me,  and after our visit,  she decided to take Clindamycin .  She did not have a reaction like she had from other antibx  (joint pain)     8/5/24  Surgeon appt .   Had another exam a couple weeks later .  And offered removal. Pt here for another opionion.   States feels fine now, and is unsure if she has to proceed with the operation .       Review of Systems    Objective   /50 (BP Location: Right arm, Patient Position: Sitting, BP Cuff Size: Adult)   Pulse 68   Temp 36.7 °C (98 °F)   Resp 12   Wt 57.2 kg (126 lb 1.6 oz)   SpO2 100%   BMI 20.35 kg/m²     Physical Exam  Rt breast ; nontender to palpation. Area of previous swelling and erythema is not present .hyperpigmented oval patch of skin .     Assessment/Plan   Problem List Items Addressed This Visit          Medium    Sebaceous cyst of skin of right breast - Primary     She is contemplating not having elective surgery to remove the cyst.  I understand this, as she has no symptoms.    Clindamycin worked well and did not give her side effects like Doxy and Bactrim .  We could treat again with this if recurs.   She would like a second opinion with FIFI Nolasco MD

## 2024-09-05 ENCOUNTER — APPOINTMENT (OUTPATIENT)
Dept: RADIOLOGY | Facility: CLINIC | Age: 59
End: 2024-09-05
Payer: COMMERCIAL

## 2024-09-12 ENCOUNTER — TELEPHONE (OUTPATIENT)
Dept: PRIMARY CARE | Facility: CLINIC | Age: 59
End: 2024-09-12
Payer: COMMERCIAL

## 2024-09-12 DIAGNOSIS — R94.31 ABNORMAL EKG: Primary | ICD-10-CM

## 2024-09-12 DIAGNOSIS — R00.1 BRADYCARDIA: ICD-10-CM

## 2024-09-12 NOTE — TELEPHONE ENCOUNTER
Patient called, she was having a biopsy done on a breast cyst and they ended up doing an EKG. On the EKG they found she had a Anteroseptal infarct, as well as a low resting heart rate. Patient does have an appointment with you in October and is asking if this is ok to wait to discuss it with you then. She is also getting blood work done prior to this appointment and is asking if you wanted to add anymore tests because of this finding.

## 2024-09-12 NOTE — TELEPHONE ENCOUNTER
Called and spoke with pt- she was informed and verbalized understanding. Pt apt was put on wait list, but would also like a cardiology referral placed.

## 2024-09-18 ENCOUNTER — APPOINTMENT (OUTPATIENT)
Dept: ALLERGY | Facility: CLINIC | Age: 59
End: 2024-09-18
Payer: COMMERCIAL

## 2024-09-23 NOTE — PROGRESS NOTES
Fatimah Arriola female   1965 58 y.o.   [id number]      Chief Complaint  New patient, right breast infection     History Of Present Illness  Fatimah Arriola is a very pleasant 58 y.o.  female presenting with second opinion on right breast infection. States she is unsure if the sebaceous cyst should be surgically excised. States this was the first occurrence a few months ago. Denies active infection at this time. Right breast infection resolved with Clindamycin. She denies breast biopsy or surgery. She reports family history breast cancer in sister and maternal cousin.    BREAST IMAGIN2024 Bilateral diagnostic mammogram and right breast ultrasound done at [facility], BI-RADS Category 2. RIGHT breast 6:00 position 7 cm from the nipple, 1.3 x 2.2 x 0.5 cm sebaceous cyst.    REPRODUCTIVE HISTORY: menarche age 14, , first birth age 24,  9 months, OCP's 0-5 years, menopause age 52, no HRT, heterogeneously dense breast tissue                                   FAMILY CANCER HISTORY:   Mother endometrial Cancer age 50    Sister Breast Cancer age 55   Maternal Cousin Breast Cancer age 40     Surgical History  She has a past surgical history that includes Other surgical history (2014); Dilation and curettage of uterus (2015); and Mouth surgery (2014).     Social History  She reports that she has never smoked. She has never been exposed to tobacco smoke. She has never used smokeless tobacco. She reports current alcohol use. She reports that she does not use drugs.    Family History  Family History   Problem Relation Name Age of Onset    Atrial fibrillation Mother      Other (Ischemic Heart Disease) Mother      Ovarian cancer Mother  55    Stroke Mother      Other (Heart Valve Replacement) Mother      Other (Abdominal Aortic Aneurysm) Father      Amyloidosis Father      Other (Carotid Endarterectomy) Father      Hypertension Father      Atrial fibrillation Sister      Breast cancer  Sister  55    Breast cancer Maternal Cousin  45        Allergies  Penicillins, Allergenic ext-mixed ragweed, Doxycycline, and Sulfamethoxazole-trimethoprim    Medications  Current Outpatient Medications   Medication Instructions    albuterol (Proventil HFA) 90 mcg/actuation inhaler 2 puffs, inhalation, By mouth every 4 hours if needed.    Dulera 100-5 mcg/actuation inhaler USE 2 INHALATIONS TWICE A DAY, RINSE MOUTH AFTER USE    EPINEPHrine (EPIPEN) 0.3 mg, intramuscular    fluticasone (Flonase Allergy Relief) 50 mcg/actuation nasal spray 1 spray, Each Nostril, Daily    NON FORMULARY HYDROGEL eye TAB         REVIEW OF SYSTEMS    Constitutional:  Negative for appetite change, fatigue, fever and unexpected weight change.   HENT:  Negative for ear pain, hearing loss, nosebleeds, sore throat and trouble swallowing.    Eyes:  Negative for discharge, itching and visual disturbance.   Respiratory:  Negative for cough, chest tightness and shortness of breath.    Cardiovascular:  Negative for chest pain, palpitations and leg swelling.   Breast: as indicated in HPI  Gastrointestinal:  Negative for abdominal pain, constipation, diarrhea and nausea.   Endocrine: Negative for cold intolerance and heat intolerance.   Genitourinary:  Negative for dysuria, frequency, hematuria, pelvic pain and vaginal bleeding.   Musculoskeletal:  Negative for arthralgias, back pain, gait problem, joint swelling and myalgias.   Skin:  Negative for color change and rash.   Allergic/Immunologic: Negative for environmental allergies and food allergies.   Neurological:  Negative for dizziness, tremors, speech difficulty, weakness, numbness and headaches.   Hematological:  Does not bruise/bleed easily.   Psychiatric/Behavioral:  Negative for agitation, dysphoric mood and sleep disturbance. The patient is not nervous/anxious.         Past Medical History  She has a past medical history of Abnormal findings on diagnostic imaging of other specified body  structures (10/27/2014), Cough, unspecified (10/22/2014), Follicular cyst of the skin and subcutaneous tissue, unspecified (10/22/2014), Other allergy status, other than to drugs and biological substances, Personal history of other diseases of the digestive system, Personal history of other diseases of the respiratory system, Personal history of other diseases of the respiratory system, Personal history of other specified conditions (04/21/2015), and Unspecified asthma, uncomplicated ([facility]) (12/02/2022).     Physical Exam  Patient is alert and oriented x3 and in a relaxed and appropriate mood. Her gait is steady and hand grasps are equal. Sclera is clear. The breasts are nearly symmetrical. Right breast 6:00 5 cm from the nipple, 1 cm resolved infection with pink skin. The tissue is soft without palpable abnormalities, discrete nodules or masses. The skin and nipples appear normal. There is no cervical, supraclavicular or axillary lymphadenopathy. Heart rate and rhythm normal, S1 and S2 appreciated. The lungs are clear to auscultation bilaterally. Abdomen is soft and non-tender.       Physical Exam  Chest:              Last Recorded Vitals  Vitals:    09/25/24 1455   BP: 121/51   Pulse: 66   Temp: 37.1 °C (98.8 °F)       Relevant Results     Imaging       Assessment/Plan       PLAN:  Normal clinical exam and imaging, no history breast biopsy or surgery, family history breast cancer, heterogeneously dense breast tissue.    Patient Discussion/Summary  Your clinical examination and imaging are normal. Please return in January for high risk evaluation office visit or sooner if you have any problems or concerns.     You can see your health information, review clinical summaries from office visits & test results online when you follow your health with MY  Chart, a personal health record. To sign up go to [email or web address]. If you need assistance with signing up or trouble getting into your account call  [facility] Patient Line 24/7 at [phone].    My office phone number is [phone] if you need to get in touch with me or have additional questions or concerns. Thank you for choosing [facility] and trusting me as your healthcare provider. I look forward to seeing you again at your next office visit. I am honored to be a provider on your health care team and I remain dedicated to helping you achieve your health goals.      Adela Nayak, APRN-CNP

## 2024-09-25 ENCOUNTER — OFFICE VISIT (OUTPATIENT)
Dept: SURGICAL ONCOLOGY | Facility: CLINIC | Age: 59
End: 2024-09-25
Payer: COMMERCIAL

## 2024-09-25 VITALS
DIASTOLIC BLOOD PRESSURE: 51 MMHG | TEMPERATURE: 98.8 F | BODY MASS INDEX: 20.34 KG/M2 | SYSTOLIC BLOOD PRESSURE: 121 MMHG | WEIGHT: 126 LBS | HEART RATE: 66 BPM

## 2024-09-25 DIAGNOSIS — N60.81 SEBACEOUS CYST OF SKIN OF RIGHT BREAST: ICD-10-CM

## 2024-09-25 PROCEDURE — 1036F TOBACCO NON-USER: CPT

## 2024-09-25 PROCEDURE — 99213 OFFICE O/P EST LOW 20 MIN: CPT

## 2024-09-25 PROCEDURE — 99203 OFFICE O/P NEW LOW 30 MIN: CPT

## 2024-09-25 ASSESSMENT — PAIN SCALES - GENERAL: PAINLEVEL: 0-NO PAIN

## 2024-09-25 NOTE — PATIENT INSTRUCTIONS
Your clinical examination and imaging are normal. Please return in January for high risk evaluation office visit or sooner if you have any problems or concerns.     You can see your health information, review clinical summaries from office visits & test results online when you follow your health with MY  Chart, a personal health record. To sign up go to [email or web address]. If you need assistance with signing up or trouble getting into your account call [employer] Patient Line 24/7 at [phone].    My office phone number is [phone] if you need to get in touch with me or have additional questions or concerns. Thank you for choosing [facility] and trusting me as your healthcare provider. I look forward to seeing you again at your next office visit. I am honored to be a provider on your health care team and I remain dedicated to helping you achieve your health goals.

## 2024-10-02 PROBLEM — R00.2 PALPITATIONS: Status: ACTIVE | Noted: 2024-10-02

## 2024-10-02 PROBLEM — J30.81 ALLERGIC RHINITIS DUE TO CATS: Status: RESOLVED | Noted: 2023-05-30 | Resolved: 2024-10-02

## 2024-10-02 PROBLEM — J32.0 CHRONIC MAXILLARY SINUSITIS: Status: ACTIVE | Noted: 2024-10-02

## 2024-10-02 PROBLEM — S82.409A FRACTURE OF FIBULA: Status: ACTIVE | Noted: 2024-10-02

## 2024-10-02 PROBLEM — R23.0 TOE CYANOSIS: Status: ACTIVE | Noted: 2024-10-02

## 2024-10-02 PROBLEM — K21.9 GASTROESOPHAGEAL REFLUX DISEASE: Status: ACTIVE | Noted: 2024-10-02

## 2024-10-02 PROBLEM — N39.0 ACUTE URINARY TRACT INFECTION: Status: ACTIVE | Noted: 2024-10-02

## 2024-10-02 PROBLEM — I83.90 VARICOSE VEINS OF LOWER EXTREMITY: Status: ACTIVE | Noted: 2024-10-02

## 2024-10-02 PROBLEM — J30.1 ALLERGIC RHINITIS DUE TO POLLEN: Status: RESOLVED | Noted: 2023-05-30 | Resolved: 2024-10-02

## 2024-10-02 PROBLEM — H10.9 CONJUNCTIVITIS: Status: ACTIVE | Noted: 2024-10-02

## 2024-10-03 ENCOUNTER — OFFICE VISIT (OUTPATIENT)
Dept: CARDIOLOGY | Facility: CLINIC | Age: 59
End: 2024-10-03
Payer: COMMERCIAL

## 2024-10-03 VITALS
SYSTOLIC BLOOD PRESSURE: 131 MMHG | OXYGEN SATURATION: 99 % | BODY MASS INDEX: 20.34 KG/M2 | WEIGHT: 126 LBS | DIASTOLIC BLOOD PRESSURE: 55 MMHG | HEART RATE: 69 BPM

## 2024-10-03 DIAGNOSIS — Z01.818 PRE-OP EXAMINATION: Primary | ICD-10-CM

## 2024-10-03 DIAGNOSIS — R94.31 ABNORMAL EKG: ICD-10-CM

## 2024-10-03 PROBLEM — H10.9 CONJUNCTIVITIS: Status: RESOLVED | Noted: 2024-10-02 | Resolved: 2024-10-03

## 2024-10-03 PROBLEM — R23.0 TOE CYANOSIS: Status: RESOLVED | Noted: 2024-10-02 | Resolved: 2024-10-03

## 2024-10-03 PROCEDURE — 99214 OFFICE O/P EST MOD 30 MIN: CPT | Performed by: STUDENT IN AN ORGANIZED HEALTH CARE EDUCATION/TRAINING PROGRAM

## 2024-10-03 PROCEDURE — 99204 OFFICE O/P NEW MOD 45 MIN: CPT | Performed by: STUDENT IN AN ORGANIZED HEALTH CARE EDUCATION/TRAINING PROGRAM

## 2024-10-03 ASSESSMENT — ENCOUNTER SYMPTOMS
MUSCULOSKELETAL NEGATIVE: 1
NEAR-SYNCOPE: 0
ORTHOPNEA: 0
EYES NEGATIVE: 1
ALLERGIC/IMMUNOLOGIC NEGATIVE: 1
ENDOCRINE NEGATIVE: 1
PSYCHIATRIC NEGATIVE: 1
GASTROINTESTINAL NEGATIVE: 1
PND: 0
HEMATOLOGIC/LYMPHATIC NEGATIVE: 1
DYSPNEA ON EXERTION: 0
CONSTITUTIONAL NEGATIVE: 1
SYNCOPE: 0
PALPITATIONS: 0
SHORTNESS OF BREATH: 1
NEUROLOGICAL NEGATIVE: 1

## 2024-10-03 NOTE — PATIENT INSTRUCTIONS
Thank you for your visit today. Please contact our office (via MyChart or phone) with any additional questions.     [facility] [place]    Renu, RN/Clinic Nurse for:    Dr. Miriam Berrios    [address], [address]  [place] [address]    Phone: [phone] Press Option 5 then Option 3 to speak with the Clinic Nurse (Renu)    _____    To Reach:    Billing Questions -    [phone]  Scheduling / Rescheduling -  Option 1  Refills / Medication Requests -  Option 3  General Office / [place] -  Option 4  Results -     Option 6  Medical Records -    Option 7  Repeat Options -    Option 9

## 2024-10-03 NOTE — PROGRESS NOTES
Cardiology New Patient History and Physical    Reason for referral: pre-op evaluation    HPI: Fatimah Arriola is a 58 y.o.  female who presents today for pre-op evaluation. Past medical history of sebaceous cyst, asthma.    Patient referred to cardiology clinic for preoperative evaluation prior to right breast sebaceous cyst excision.  Patient presented to cardiology clinic on 10/3/2024.  Patient is currently asymptomatic from a cardiac standpoint and can tolerate greater than 4 METS without active cardiovascular complaints.  Patient's cardiac risk factors include family history of cardiovascular disease in her mother, father.  Patient is a non-smoker.  CT cardiac scoring showed total coronary calcium score 0 in 2020.  EKG in September 2024 showed sinus bradycardia with; otherwise normal EKG.    Past Medical History:   - as above    Surgical History:   She has a past surgical history that includes Other surgical history (12/23/2014); Dilation and curettage of uterus (07/08/2015); and Mouth surgery (06/19/2014).    Family History:   Family History   Problem Relation Name Age of Onset    Atrial fibrillation Mother      Other (Ischemic Heart Disease) Mother      Endometrial cancer Mother  50    Stroke Mother      Other (Heart Valve Replacement) Mother      Other (Abdominal Aortic Aneurysm) Father      Amyloidosis Father      Other (Carotid Endarterectomy) Father      Hypertension Father      Atrial fibrillation Sister      Breast cancer Sister  55    Breast cancer Maternal Cousin  40     Allergies:  Penicillins, Allergenic ext-mixed ragweed, Doxycycline, and Sulfamethoxazole-trimethoprim     Social History:   - non-smoker; no illicit drug use  - 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     CT cardiac scoring (2/2020)- total coronary calcium score  0    Review of Systems:  Review of Systems   Constitutional: Negative.   HENT: Negative.     Eyes: Negative.    Cardiovascular:  Negative for chest pain, dyspnea on exertion, near-syncope, orthopnea, palpitations, paroxysmal nocturnal dyspnea and syncope.   Endocrine: Negative.    Hematologic/Lymphatic: Negative.    Skin: Negative.    Musculoskeletal: Negative.    Gastrointestinal: Negative.    Genitourinary: Negative.    Neurological: Negative.    Psychiatric/Behavioral: Negative.     Allergic/Immunologic: Negative.        Objective     Outpatient Medications:    Current Outpatient Medications:     albuterol (Proventil HFA) 90 mcg/actuation inhaler, Inhale 2 puffs. By mouth every 4 hours if needed., Disp: , Rfl:     Dulera 100-5 mcg/actuation inhaler, USE 2 INHALATIONS TWICE A DAY, RINSE MOUTH AFTER USE, Disp: 39 g, Rfl: 3    EPINEPHrine 0.3 mg/0.3 mL injection syringe, Inject 0.3 mL (0.3 mg) into the muscle., Disp: , Rfl:     fluticasone (Flonase Allergy Relief) 50 mcg/actuation nasal spray, Administer 1 spray into each nostril once daily., Disp: , Rfl:     NON FORMULARY, HYDROGEL eye TAB, Disp: , Rfl:      Last Recorded Vitals  /55 (BP Location: Left arm, Patient Position: Sitting)   Pulse 69   Wt 57.2 kg (126 lb)   SpO2 99%   BMI 20.34 kg/m²     Physical Exam:  Physical Exam  Constitutional:       General: She is not in acute distress.  HENT:      Head: Normocephalic.      Mouth/Throat:      Mouth: Mucous membranes are moist.   Eyes:      Extraocular Movements: Extraocular movements intact.      Conjunctiva/sclera: Conjunctivae normal.   Neck:      Vascular: No carotid bruit or JVD.   Cardiovascular:      Rate and Rhythm: Normal rate and regular rhythm.      Pulses: Normal pulses.      Heart sounds: No murmur heard.  Pulmonary:      Effort: Pulmonary effort is normal. No respiratory distress.      Breath sounds: Normal breath sounds.   Abdominal:      General: Bowel sounds are normal. There is no  distension.      Palpations: Abdomen is soft.   Musculoskeletal:         General: No swelling.   Skin:     General: Skin is warm and dry.   Neurological:      General: No focal deficit present.      Mental Status: She is alert.      Cranial Nerves: No cranial nerve deficit.      Motor: No weakness.   Psychiatric:         Mood and Affect: Mood normal.         Behavior: Behavior normal.         Lab Review:    Lab Results   Component Value Date    GLUCOSE 83 10/09/2023    CALCIUM 9.7 10/09/2023     10/09/2023    K 4.0 10/09/2023    CO2 32 10/09/2023     10/09/2023    BUN 15 10/09/2023    CREATININE 0.82 10/09/2023       Lab Results   Component Value Date    WBC 4.5 10/09/2023    HGB 13.2 10/09/2023    HCT 40.9 10/09/2023    MCV 95 10/09/2023     10/09/2023       Lab Results   Component Value Date    CHOL 173 10/09/2023    CHOL 153 12/03/2021    CHOL 190 09/08/2020     Lab Results   Component Value Date    HDL 71.2 10/09/2023    HDL 63.3 12/03/2021    HDL 64.4 09/08/2020     Lab Results   Component Value Date    LDLCALC 93 (L) 10/09/2023     Lab Results   Component Value Date    TRIG 44 10/09/2023    TRIG 42 12/03/2021    TRIG 58 09/08/2020     Lab Results   Component Value Date    TSH 2.24 10/18/2019       Assessment:   58 y.o.  female who presents today for pre-op evaluation. Past medical history of sebaceous cyst, asthma.    Patient's cardiac risk factors include family history of cardiovascular disease in her mother, father.  Patient is a non-smoker.  CT cardiac scoring showed total coronary calcium score 0 in 2020.  EKG in September 2024 showed sinus bradycardia with; otherwise normal EKG. patient's revised cardiac risk index score is 0, which carries a 3.9% risk of any perioperative major adverse cardiac events.  As the patient is asymptomatic and is low risk from a cardiac standpoint recommend proceeding with surgery without additional cardiovascular testing.    Overall Plan:  1.   Preoperative evaluation prior to sebaceous cyst excision  - Recommend proceeding with proposed surgery without additional cardiovascular testing    2.  Prior EKG reviewed from September 2024; per my personal interpretation sinus bradycardia without ischemic changes; computer noted possible inferior Q waves which is over interpretation per my personal review    3.  Encouraged regular physical activity    Disposition: Return to cardiology clinic as needed    Jules Pineda MD

## 2024-10-17 ENCOUNTER — LAB (OUTPATIENT)
Dept: LAB | Facility: LAB | Age: 59
End: 2024-10-17
Payer: COMMERCIAL

## 2024-10-17 DIAGNOSIS — Z00.00 HEALTHCARE MAINTENANCE: ICD-10-CM

## 2024-10-17 DIAGNOSIS — R00.1 BRADYCARDIA: ICD-10-CM

## 2024-10-17 LAB
ALBUMIN SERPL BCP-MCNC: 4.6 G/DL (ref 3.4–5)
ALP SERPL-CCNC: 47 U/L (ref 33–110)
ALT SERPL W P-5'-P-CCNC: 12 U/L (ref 7–45)
ANION GAP SERPL CALC-SCNC: 12 MMOL/L (ref 10–20)
AST SERPL W P-5'-P-CCNC: 47 U/L (ref 9–39)
BILIRUB SERPL-MCNC: 0.6 MG/DL (ref 0–1.2)
BUN SERPL-MCNC: 13 MG/DL (ref 6–23)
CALCIUM SERPL-MCNC: 10 MG/DL (ref 8.6–10.6)
CHLORIDE SERPL-SCNC: 103 MMOL/L (ref 98–107)
CHOLEST SERPL-MCNC: 171 MG/DL (ref 0–199)
CHOLESTEROL/HDL RATIO: 2.5
CO2 SERPL-SCNC: 31 MMOL/L (ref 21–32)
CREAT SERPL-MCNC: 0.67 MG/DL (ref 0.5–1.05)
EGFRCR SERPLBLD CKD-EPI 2021: >90 ML/MIN/1.73M*2
ERYTHROCYTE [DISTWIDTH] IN BLOOD BY AUTOMATED COUNT: 12 % (ref 11.5–14.5)
GLUCOSE SERPL-MCNC: 89 MG/DL (ref 74–99)
HCT VFR BLD AUTO: 42.7 % (ref 36–46)
HDLC SERPL-MCNC: 67.1 MG/DL
HGB BLD-MCNC: 13.5 G/DL (ref 12–16)
LDLC SERPL CALC-MCNC: 92 MG/DL
MCH RBC QN AUTO: 30 PG (ref 26–34)
MCHC RBC AUTO-ENTMCNC: 31.6 G/DL (ref 32–36)
MCV RBC AUTO: 95 FL (ref 80–100)
NON HDL CHOLESTEROL: 104 MG/DL (ref 0–149)
NRBC BLD-RTO: 0 /100 WBCS (ref 0–0)
PLATELET # BLD AUTO: 288 X10*3/UL (ref 150–450)
POTASSIUM SERPL-SCNC: 5 MMOL/L (ref 3.5–5.3)
PROT SERPL-MCNC: 7.4 G/DL (ref 6.4–8.2)
RBC # BLD AUTO: 4.5 X10*6/UL (ref 4–5.2)
SODIUM SERPL-SCNC: 141 MMOL/L (ref 136–145)
TRIGL SERPL-MCNC: 59 MG/DL (ref 0–149)
TSH SERPL-ACNC: 2.76 MIU/L (ref 0.44–3.98)
VLDL: 12 MG/DL (ref 0–40)
WBC # BLD AUTO: 5.6 X10*3/UL (ref 4.4–11.3)

## 2024-10-17 PROCEDURE — 80053 COMPREHEN METABOLIC PANEL: CPT

## 2024-10-17 PROCEDURE — 84443 ASSAY THYROID STIM HORMONE: CPT

## 2024-10-17 PROCEDURE — 36415 COLL VENOUS BLD VENIPUNCTURE: CPT

## 2024-10-17 PROCEDURE — 85027 COMPLETE CBC AUTOMATED: CPT

## 2024-10-17 PROCEDURE — 80061 LIPID PANEL: CPT

## 2024-10-21 ENCOUNTER — APPOINTMENT (OUTPATIENT)
Dept: SURGICAL ONCOLOGY | Facility: CLINIC | Age: 59
End: 2024-10-21
Payer: COMMERCIAL

## 2024-10-22 PROBLEM — Z01.818 PRE-OP EXAMINATION: Status: ACTIVE | Noted: 2024-10-22

## 2024-10-22 PROBLEM — R94.31 ABNORMAL EKG: Status: ACTIVE | Noted: 2024-10-22

## 2024-10-22 NOTE — PROGRESS NOTES
Subjective   Patient ID: Fatimah Arriola is a 59 y.o. female who presents for Annual Exam (Pt presents for annual physical exam- ABN was given to pt and signed, pt verbalized understanding. Pt states that she has discussed this with you before, but she is still having some left hip pain that she would like to discuss.).    HPI     Patient presents today for annual physical + routine follow-up.    Patient concerns:    # L hip pain  Reports has discussed in past but continues  Pain is around bursa area, some days hurts worse than others  Did PT in past when think she pulled a muscle  Continues to do these exercises independently a few days per week  Rates as 3 out of 10 pain when bothersome, today is a 0  She is able to continue her activities as she wants including walking, biking, playing with grand child  When bothersome unable to lay on left side, will notice some pain/stiffness with prolonged standing/sitting.    # Anxiety  Feels becoming more prevalent with age  No presently on mood medications  Does not think bothersome enough to need rx at this time.    WELLNESS VISIT   TDAP: 2019  SHINGRIX: none found - DUE  PNEUMOVAX: due for prevnar-20? Hx of asthma  PAP: 3/2023 (ASCUS negative, HPV negative) - done via GYN (Dr. Pryor)  MAMMO: 8/2024 (FMHX of sister (dx 54 y/o), cousin, m. Grandmother)  CSCOPE: 10/2017 (no specimens, diverticulosis, hemorrhoids, good prep) (No FMHx)  DEXA: N/A  HEP C SCREEN: 10/2017 negative  CACS: 0 (low) in 2/2020   LIPID: 10/2024 TC/HDL 2.5, HDL 67, LDL 92, TRIG 59    Patient declines influenza, Shingrix, and Prevnar-20 vaccines today. States thinks may be coming down with something so will get done at the pharmacy.    Alcohol use: occasional  Smoking: never smoker    Cervical cancer screening: utd via GYN  Denies family history in first degree relative.  Denies pelvic pain, vaginal discharge, or vaginal bleeding.    Breast cancer screening: utd  Denies family history in first degree  "relative.  Denies lumps/bumps, skin changes, nipple retraction, or nipple drainage.    Colon cancer screening: utd  Denies family history in first degree relative.  Denies melena, hematochezia, constipation, diarrhea, bloating, change in bowel habits.    ROUTINE VISIT  CHRONIC CONDITIONS:   Asthma   Her asthma has historically done well with the Dulera  She states that she has not needed the albuterol in quite some time    No respiratory symptoms reported today  Continues with Dulera daily which is keeping asthma under good control.    Lung nodules  Pulmonary nodularity stable CT imaging 2/2020 - 10/2020  Evaluated by pulmonology, felt to nonspecific and benign in nature probably related to past infection  Patient reported having had \"13\"pneumonias over the years when she was younger in relationship to her asthma.   Patient nonsmoker but did have secondhand smoke her parents growing up.     Elevated LFTs  AST mildly elevated in past but remaining LFTs normal.  AST elevated starting in 2019 at 40, highest 50.  No fatty liver noted on prior CT chest imaging.  No FMHx of liver disease  EtOH intake rare, infrequent.    10/2024 AST 47  10/2023 AST 48  11/2022 AST 47   12/2021 AST 50  09/2020 AST 40  05/2019 AST 40     Varicose veins  Wears compression stockings when traveling but not on regular bases.      Review of Systems   All other systems reviewed and are negative.      Objective   /65 (BP Location: Left arm, Patient Position: Sitting, BP Cuff Size: Adult)   Pulse 65   Temp 36.9 °C (98.5 °F) (Temporal)   Ht 1.676 m (5' 6\")   Wt 58.7 kg (129 lb 8 oz)   SpO2 99%   BMI 20.90 kg/m²     Physical Exam  Vitals and nursing note reviewed.   Constitutional:       General: She is not in acute distress.     Appearance: Normal appearance. She is not toxic-appearing.   HENT:      Head: Normocephalic and atraumatic.   Neck:      Thyroid: No thyromegaly.      Vascular: No hepatojugular reflux or JVD.   Cardiovascular: "      Rate and Rhythm: Normal rate and regular rhythm.      Heart sounds: No murmur heard.     No friction rub. No gallop.   Pulmonary:      Effort: Pulmonary effort is normal.      Breath sounds: Normal breath sounds. No wheezing, rhonchi or rales.   Abdominal:      General: Bowel sounds are normal. There is no distension.      Palpations: Abdomen is soft. There is no mass.      Tenderness: There is no abdominal tenderness. There is no guarding.   Musculoskeletal:      Right lower leg: No edema (few varicosities).      Left lower leg: No edema (few varicosities).      Comments: Increased facial tightness medial to ASIS on left  FROM of right and left hip   Lymphadenopathy:      Cervical: No cervical adenopathy.   Neurological:      General: No focal deficit present.      Mental Status: She is alert and oriented to person, place, and time.      Gait: Gait normal.      Deep Tendon Reflexes: Reflexes are normal and symmetric. Reflexes normal.   Psychiatric:         Mood and Affect: Mood normal.         Behavior: Behavior normal.         Assessment/Plan   Problem List Items Addressed This Visit             ICD-10-CM    Asthma J45.909     Her asthma has historically done well with the Dulera  She states that she has not needed the albuterol in quite some time         Elevated AST (SGOT) R74.01     AST mildly elevated in past but remaining LFTs normal.  AST elevated starting in 2019 at 40, highest 50.  No fatty liver noted on prior CAT scan imaging or US gallbladder (2016).  No FMHx of liver disease  EtOH rare, infrequent.  No chronic Tylenol usage.    10/2024 AST 47  10/2023 AST 48  11/2022 AST 47   12/2021 AST 50  09/2020 AST 40  05/2019 AST 40    Patient reassured that AST is a serum test and can come from other sources than just the liver. At this level of very mild elevation that has been stable for years now and prior imaging that showed liver normal this is not concerning, patient reassured. Will continue to  monitor CMP annually.         Healthcare maintenance - Primary Z00.00     Vaccines and screenings reviewed.  Questionnaires completed.  Health and wellness topics reviewed.  Diet and exercise recommendations revisited.  Routine blood work reviewed today.     VACCINES:  -Patient declines influenza, Shingrix, and Prevnar-20 vaccines today. States thinks may be coming down with something so will get done at the pharmacy.    SCREENINGS:  -Screening pap smear UTD, last completed 3/2023 via GYN.  -Screening mammogram UTD, last completed 8/2024, to be done annually.  -Screening colonoscopy UTD, last completed 2017, due 2027.  -Screening DEXA recommended starting at age 65.    LIFESTYLE MEASURES  -make sure you are avoiding refined carbs such as breads, pasta, cereal, candy, soda,  nutrition bars, granola, chips, and sugar sweetened beverages.      -eat 5- 7 servings daily of veggies,  healthy protein such as chicken, fish,  beans, and eggs, and include healthy fats in your diet such as seeds, nuts, olive oil, avocados, and salmon.   -exercise 4 - 6 days per week as you are able, 150 minutes total weekly divided up is recommended.  -Vitamin D is recommended at 1000 - 5000 IU international units daily.   -Always wear sunscreen when you have sun exposure.  -64 oz of water is recommended daily.  -Dental visits recommended every 6 months.  -Eye exam recommended every 2 years, for those with vision problems every year.           Hip pain, left M25.552     Ongoing for months, discomfort off and and, previously completed PT  Hip x-ray ordered today  Will bring back for appointment for myofacial release.           Relevant Orders    XR hip left with pelvis when performed 2 or 3 views    Anxiety F41.9     Does not feel problematic enough to start prescription medication at this time, however, she can reach out at anytime if she wishes to further address.    May consider doing trial with Ashwagandha which is available  over-the-counter.    Lifestyle measures as recommended below.    Nutrition plays a large role in how we feel, including influencing or mood.   For overall health as well as mental well-being, would  recommend limitation of refined carbohydrates such as pasta, pretzels, crackers, breads, alcohol, sugar sweetened foods or beverages , pastries, cereals, or bagels.   Recommend eating lots of vegetables, lean proteins, some fruits.  Eat small amounts of healthy fat daily, such as a handful of almonds or walnuts, a serving of salmon, a generous splash of olive oil on your salad, an avocado.      Physical activity also positively impacts mood. Anxiety and depression can be significantly improved with consistent exercise. The recommended long-term goal is 4-6 days per week, 30 minutes on those days. Even starting with 5 minutes of brisk walking a couple of times a day a few times a week is a great start.    Consider starting an exercise routine such as Yoga with Rosie , which has free yoga on line through You Tube.  [employer] walking [employer] also offer free exercise sessions on line.  For time efficient but challenging strength and cardio exercises, consider adding in some high intensity interval training (HIIT) sessions such as the free 7 min workout Tabata songs on You Tube .    Journaling, or writing down thoughts, can be therapeutic.   Starting or continuing a gratitude journal to help you focus on positive things in life can make a big difference with depression or anxiety.  Consider writing down 3 things each day for which you are grateful, such as the sound of birds outside, the comfort of a cup of coffee, the company of a pet, etc.   Reflect back on that at the end of each week and relive those moments of gratitude.     Omega-3 fatty acid supplementation may also help mood. Fish oil or krill oil, up  to 1000 mg daily, can positively influence mood in addition to above lifestyle measures.     Meditation can be  helpful for many challenges we face such as anxiety, depression, and stress.   Like exercise brings about fitness and well-being for the body, meditation can bring fitness to the mind creating a calmer, more positive mental state.   There are many good resources for more information on meditation, including the web sites  www.ckc-po-ropvgxfm.org  , www.calm.com,  smart phone applications such as Calm: Meditate and relax with guided mindfulness (free) ,  Meditation Studio by Muse, ($3.99)   Paul ($4.99), and Head Space (price varies, free intro with options for subscriptions).    It is recommended to remove yourself from technology and bustle every day -   take a 2 minute walk outside at lunch, take a hike for 15 minutes on the way home from work, get up 15 minutes early and find something peaceful and calming on which to focus, such as your breathing, sounds of nature, looking at trees or even a pleasant picture or houseplant.    Building calm into your day can set the tone, and quiet the undercurrent of thoughts. Daily practice can make a significant positive difference.    Counseling services can also be extremely helpful.  Call your insurance to see what agencies are covered. If covered, a commonly used agency by our patients is [facility] in [place] or [place] ([phone]).           Other Visit Diagnoses         Codes    Body mass index (BMI) of 20.0 to 20.9 in adult     Z68.20          Follow-up in 1 month for OMM appointment.    Follow-up in 1 year for annual physical.   Call for sooner follow-up if needed.         Scribe Attestation  By signing my name below, Majo BLAIR Scribe   attest that this documentation has been prepared under the direction and in the presence of Minda Hutson DO.

## 2024-10-23 ENCOUNTER — APPOINTMENT (OUTPATIENT)
Dept: PRIMARY CARE | Facility: CLINIC | Age: 59
End: 2024-10-23
Payer: COMMERCIAL

## 2024-10-23 VITALS
DIASTOLIC BLOOD PRESSURE: 65 MMHG | WEIGHT: 129.5 LBS | HEART RATE: 65 BPM | SYSTOLIC BLOOD PRESSURE: 103 MMHG | HEIGHT: 66 IN | BODY MASS INDEX: 20.81 KG/M2 | TEMPERATURE: 98.5 F | OXYGEN SATURATION: 99 %

## 2024-10-23 DIAGNOSIS — J45.909 ASTHMA, UNSPECIFIED ASTHMA SEVERITY, UNSPECIFIED WHETHER COMPLICATED, UNSPECIFIED WHETHER PERSISTENT (HHS-HCC): ICD-10-CM

## 2024-10-23 DIAGNOSIS — R74.01 ELEVATED AST (SGOT): ICD-10-CM

## 2024-10-23 DIAGNOSIS — M25.552 HIP PAIN, LEFT: ICD-10-CM

## 2024-10-23 DIAGNOSIS — Z00.00 HEALTHCARE MAINTENANCE: Primary | ICD-10-CM

## 2024-10-23 DIAGNOSIS — F41.9 ANXIETY: ICD-10-CM

## 2024-10-23 PROBLEM — N39.0 ACUTE URINARY TRACT INFECTION: Status: RESOLVED | Noted: 2024-10-02 | Resolved: 2024-10-23

## 2024-10-23 PROBLEM — Z01.818 PRE-OP EXAMINATION: Status: RESOLVED | Noted: 2024-10-22 | Resolved: 2024-10-23

## 2024-10-23 PROBLEM — J32.0 CHRONIC MAXILLARY SINUSITIS: Status: RESOLVED | Noted: 2024-10-02 | Resolved: 2024-10-23

## 2024-10-23 PROCEDURE — 99396 PREV VISIT EST AGE 40-64: CPT | Performed by: FAMILY MEDICINE

## 2024-10-23 PROCEDURE — 1036F TOBACCO NON-USER: CPT | Performed by: FAMILY MEDICINE

## 2024-10-23 PROCEDURE — 3008F BODY MASS INDEX DOCD: CPT | Performed by: FAMILY MEDICINE

## 2024-10-23 ASSESSMENT — PROMIS GLOBAL HEALTH SCALE
RATE_AVERAGE_FATIGUE: MILD
RATE_QUALITY_OF_LIFE: VERY GOOD
RATE_AVERAGE_PAIN: 2
CARRYOUT_PHYSICAL_ACTIVITIES: COMPLETELY
RATE_PHYSICAL_HEALTH: VERY GOOD
RATE_GENERAL_HEALTH: VERY GOOD
CARRYOUT_SOCIAL_ACTIVITIES: VERY GOOD
EMOTIONAL_PROBLEMS: SOMETIMES
RATE_MENTAL_HEALTH: GOOD
RATE_SOCIAL_SATISFACTION: VERY GOOD

## 2024-10-23 NOTE — PATIENT INSTRUCTIONS
Flu shot at pharm     Shingles series at pharm     Pneumonia vaccine (due to asthma history)     For inflammation try turmeric (curcumin) 500 - 1000 mg daily       Hip pain, left  Ongoing for months, discomfort off and and, previously completed PT  Hip x-ray ordered today  Will bring back for appointment for myofacial release.      Healthcare maintenance  Vaccines and screenings reviewed.  Questionnaires completed.  Health and wellness topics reviewed.  Diet and exercise recommendations revisited.  Routine blood work reviewed today.     VACCINES:  -Patient declines influenza, Shingrix, and Prevnar-20 vaccines today. States thinks may be coming down with something so will get done at the pharmacy.    SCREENINGS:  -Screening pap smear UTD, last completed 3/2023 via GYN.  -Screening mammogram UTD, last completed 8/2024, to be done annually.  -Screening colonoscopy UTD, last completed 2017, due 2027.  -Screening DEXA recommended starting at age 65.    LIFESTYLE MEASURES  -make sure you are avoiding refined carbs such as breads, pasta, cereal, candy, soda,  nutrition bars, granola, chips, and sugar sweetened beverages.      -eat 5- 7 servings daily of veggies,  healthy protein such as chicken, fish,  beans, and eggs, and include healthy fats in your diet such as seeds, nuts, olive oil, avocados, and salmon.   -exercise 4 - 6 days per week as you are able, 150 minutes total weekly divided up is recommended.  -Vitamin D is recommended at 1000 - 5000 IU international units daily.   -Always wear sunscreen when you have sun exposure.  -64 oz of water is recommended daily.  -Dental visits recommended every 6 months.  -Eye exam recommended every 2 years, for those with vision problems every year.      Elevated AST (SGOT)  AST mildly elevated in past but remaining LFTs normal.  AST elevated starting in 2019 at 40, highest 50.  No fatty liver noted on prior CAT scan imaging or US gallbladder (2016).  No FMHx of liver  disease  EtOH rare, infrequent.  No chronic Tylenol usage.    10/2024 AST 47  10/2023 AST 48  11/2022 AST 47   12/2021 AST 50  09/2020 AST 40  05/2019 AST 40    Patient reassured that AST is a serum test and can come from other sources than just the liver. At this level of very mild elevation that has been stable for years now and prior imaging that showed liver normal this is not concerning, patient reassured. Will continue to monitor CMP annually.    Asthma ([facility])  Her asthma has historically done well with the Dulera  She states that she has not needed the albuterol in quite some time    Anxiety  Does not feel problematic enough to start prescription medication at this time, however, she can reach out at anytime if she wishes to further address.    May consider doing trial with Ashwagandha which is available over-the-counter.    Lifestyle measures as recommended below.    Nutrition plays a large role in how we feel, including influencing or mood.   For overall health as well as mental well-being, would  recommend limitation of refined carbohydrates such as pasta, pretzels, crackers, breads, alcohol, sugar sweetened foods or beverages , pastries, cereals, or bagels.   Recommend eating lots of vegetables, lean proteins, some fruits.  Eat small amounts of healthy fat daily, such as a handful of almonds or walnuts, a serving of salmon, a generous splash of olive oil on your salad, an avocado.      Physical activity also positively impacts mood. Anxiety and depression can be significantly improved with consistent exercise. The recommended long-term goal is 4-6 days per week, 30 minutes on those days. Even starting with 5 minutes of brisk walking a couple of times a day a few times a week is a great start.    Consider starting an exercise routine such as Yoga with Rosie , which has free yoga on line through You Tube.  [employer] walking videos also offer free exercise sessions on line.  For time efficient but  challenging strength and cardio exercises, consider adding in some high intensity interval training (HIIT) sessions such as the free 7 min workout Tabata songs on You Tube .    Journaling, or writing down thoughts, can be therapeutic.   Starting or continuing a gratitude journal to help you focus on positive things in life can make a big difference with depression or anxiety.  Consider writing down 3 things each day for which you are grateful, such as the sound of birds outside, the comfort of a cup of coffee, the company of a pet, etc.   Reflect back on that at the end of each week and relive those moments of gratitude.     Omega-3 fatty acid supplementation may also help mood. Fish oil or krill oil, up  to 1000 mg daily, can positively influence mood in addition to above lifestyle measures.     Meditation can be helpful for many challenges we face such as anxiety, depression, and stress.   Like exercise brings about fitness and well-being for the body, meditation can bring fitness to the mind creating a calmer, more positive mental state.   There are many good resources for more information on meditation, including the web sites  www.ptz-sm-ivapjcyj.org  , www.calm.com,  smart phone applications such as Calm: Meditate and relax with guided mindfulness (free) ,  Meditation Studio by Muse, ($3.99)   Paul ($4.99), and Head Space (price varies, free intro with options for subscriptions).    It is recommended to remove yourself from technology and bustle every day -   take a 2 minute walk outside at lunch, take a hike for 15 minutes on the way home from work, get up 15 minutes early and find something peaceful and calming on which to focus, such as your breathing, sounds of nature, looking at trees or even a pleasant picture or houseplant.    Building calm into your day can set the tone, and quiet the undercurrent of thoughts. Daily practice can make a significant positive difference.    Counseling services can  also be extremely helpful.  Call your insurance to see what agencies are covered. If covered, a commonly used agency by our patients is [facility] in [place] or [place] ([phone]).

## 2024-10-23 NOTE — ASSESSMENT & PLAN NOTE
AST mildly elevated in past but remaining LFTs normal.  AST elevated starting in 2019 at 40, highest 50.  No fatty liver noted on prior CAT scan imaging or US gallbladder (2016).  No FMHx of liver disease  EtOH rare, infrequent.  No chronic Tylenol usage.    10/2024 AST 47  10/2023 AST 48  11/2022 AST 47   12/2021 AST 50  09/2020 AST 40  05/2019 AST 40    Patient reassured that AST is a serum test and can come from other sources than just the liver. At this level of very mild elevation that has been stable for years now and prior imaging that showed liver normal this is not concerning, patient reassured. Will continue to monitor CMP annually.

## 2024-10-23 NOTE — ASSESSMENT & PLAN NOTE
Vaccines and screenings reviewed.  Questionnaires completed.  Health and wellness topics reviewed.  Diet and exercise recommendations revisited.  Routine blood work reviewed today.     VACCINES:  -Patient declines influenza, Shingrix, and Prevnar-20 vaccines today. States thinks may be coming down with something so will get done at the pharmacy.    SCREENINGS:  -Screening pap smear UTD, last completed 3/2023 via GYN.  -Screening mammogram UTD, last completed 8/2024, to be done annually.  -Screening colonoscopy UTD, last completed 2017, due 2027.  -Screening DEXA recommended starting at age 65.    LIFESTYLE MEASURES  -make sure you are avoiding refined carbs such as breads, pasta, cereal, candy, soda,  nutrition bars, granola, chips, and sugar sweetened beverages.      -eat 5- 7 servings daily of veggies,  healthy protein such as chicken, fish,  beans, and eggs, and include healthy fats in your diet such as seeds, nuts, olive oil, avocados, and salmon.   -exercise 4 - 6 days per week as you are able, 150 minutes total weekly divided up is recommended.  -Vitamin D is recommended at 1000 - 5000 IU international units daily.   -Always wear sunscreen when you have sun exposure.  -64 oz of water is recommended daily.  -Dental visits recommended every 6 months.  -Eye exam recommended every 2 years, for those with vision problems every year.

## 2024-10-23 NOTE — ASSESSMENT & PLAN NOTE
Does not feel problematic enough to start prescription medication at this time, however, she can reach out at anytime if she wishes to further address.    May consider doing trial with Ashwagandha which is available over-the-counter.    Lifestyle measures as recommended below.    Nutrition plays a large role in how we feel, including influencing or mood.   For overall health as well as mental well-being, would  recommend limitation of refined carbohydrates such as pasta, pretzels, crackers, breads, alcohol, sugar sweetened foods or beverages , pastries, cereals, or bagels.   Recommend eating lots of vegetables, lean proteins, some fruits.  Eat small amounts of healthy fat daily, such as a handful of almonds or walnuts, a serving of salmon, a generous splash of olive oil on your salad, an avocado.      Physical activity also positively impacts mood. Anxiety and depression can be significantly improved with consistent exercise. The recommended long-term goal is 4-6 days per week, 30 minutes on those days. Even starting with 5 minutes of brisk walking a couple of times a day a few times a week is a great start.    Consider starting an exercise routine such as Yoga with Rosie , which has free yoga on line through You Tube.  [employer] also offer free exercise sessions on line.  For time efficient but challenging strength and cardio exercises, consider adding in some high intensity interval training (HIIT) sessions such as the free 7 min workout Tabata songs on You Tube .    Journaling, or writing down thoughts, can be therapeutic.   Starting or continuing a gratitude journal to help you focus on positive things in life can make a big difference with depression or anxiety.  Consider writing down 3 things each day for which you are grateful, such as the sound of birds outside, the comfort of a cup of coffee, the company of a pet, etc.   Reflect back on that at the end of each week and relive those  moments of gratitude.     Omega-3 fatty acid supplementation may also help mood. Fish oil or krill oil, up  to 1000 mg daily, can positively influence mood in addition to above lifestyle measures.     Meditation can be helpful for many challenges we face such as anxiety, depression, and stress.   Like exercise brings about fitness and well-being for the body, meditation can bring fitness to the mind creating a calmer, more positive mental state.   There are many good resources for more information on meditation, including the web sites  www.sgl-jp-aifbxzjg.org  , www.calm.com,  smart phone applications such as Calm: Meditate and relax with guided mindfulness (free) ,  Meditation Studio by Muse, ($3.99)   Paul ($4.99), and Head Space (price varies, free intro with options for subscriptions).    It is recommended to remove yourself from technology and bustle every day -   take a 2 minute walk outside at lunch, take a hike for 15 minutes on the way home from work, get up 15 minutes early and find something peaceful and calming on which to focus, such as your breathing, sounds of nature, looking at trees or even a pleasant picture or houseplant.    Building calm into your day can set the tone, and quiet the undercurrent of thoughts. Daily practice can make a significant positive difference.    Counseling services can also be extremely helpful.  Call your insurance to see what agencies are covered. If covered, a commonly used agency by our patients is [facility] in [place] or [place] ([phone]).

## 2024-10-23 NOTE — ASSESSMENT & PLAN NOTE
Ongoing for months, discomfort off and and, previously completed PT  Hip x-ray ordered today  Will bring back for appointment for myofacial release.

## 2024-11-07 ENCOUNTER — APPOINTMENT (OUTPATIENT)
Dept: PRIMARY CARE | Facility: CLINIC | Age: 59
End: 2024-11-07
Payer: COMMERCIAL

## 2024-11-07 VITALS
BODY MASS INDEX: 20.72 KG/M2 | DIASTOLIC BLOOD PRESSURE: 50 MMHG | WEIGHT: 128.4 LBS | HEART RATE: 56 BPM | SYSTOLIC BLOOD PRESSURE: 107 MMHG | OXYGEN SATURATION: 98 % | TEMPERATURE: 98.2 F

## 2024-11-07 DIAGNOSIS — R15.1 FECAL SMEARING: ICD-10-CM

## 2024-11-07 DIAGNOSIS — M25.552 HIP PAIN, LEFT: Primary | ICD-10-CM

## 2024-11-07 DIAGNOSIS — M99.05 SOMATIC DYSFUNCTION OF PELVIS REGION: ICD-10-CM

## 2024-11-07 DIAGNOSIS — R32 URINARY INCONTINENCE, UNSPECIFIED TYPE: ICD-10-CM

## 2024-11-07 DIAGNOSIS — M99.03 SOMATIC DYSFUNCTION OF SPINE, LUMBAR: ICD-10-CM

## 2024-11-07 DIAGNOSIS — M99.04 SOMATIC DYSFUNCTION OF SACRAL REGION: ICD-10-CM

## 2024-11-07 DIAGNOSIS — Z23 IMMUNIZATION DUE: ICD-10-CM

## 2024-11-07 PROCEDURE — 90471 IMMUNIZATION ADMIN: CPT | Performed by: FAMILY MEDICINE

## 2024-11-07 PROCEDURE — 90656 IIV3 VACC NO PRSV 0.5 ML IM: CPT | Performed by: FAMILY MEDICINE

## 2024-11-07 PROCEDURE — 99213 OFFICE O/P EST LOW 20 MIN: CPT | Performed by: FAMILY MEDICINE

## 2024-11-07 PROCEDURE — 1036F TOBACCO NON-USER: CPT | Performed by: FAMILY MEDICINE

## 2024-11-07 PROCEDURE — 98926 OSTEOPATH MANJ 3-4 REGIONS: CPT | Performed by: FAMILY MEDICINE

## 2024-11-07 NOTE — PROGRESS NOTES
Subjective   Patient ID: Fatimah Arriola is a 59 y.o. female who presents for red light therapy (Pt presents for red light therapy- area that is bothering her the most is the left hip) and Flu Vaccine (Pt would like to receive their flu vaccine today.  ).    HPI     Patient presents today for red light therapy and OMM.    # L hip pain, lumbar pain  Reports has discussed in past but continues  Pain is around bursa area, some days hurts worse than others  Did PT in past when think she pulled a muscle  Continues to do these exercises independently a few days per week  Rates as 3 out of 10 pain when bothersome, today is a 0  She is able to continue her activities as she wants including walking, biking, playing with grand child  When bothersome unable to lay on left side, will notice some pain/stiffness with prolonged standing/sitting.     Does have occas urinary incontinence and occasional fecal smearing     Interested in pelvic floor PT      No numbness or weakness in legs or saddle anesthesia     Review of Systems   All other systems reviewed and are negative.      Objective   /50 (BP Location: Left arm, Patient Position: Sitting, BP Cuff Size: Adult)   Pulse 56   Temp 36.8 °C (98.2 °F) (Temporal)   Wt 58.2 kg (128 lb 6.4 oz)   SpO2 98%   BMI 20.72 kg/m²       Patient ID: Fatiamh Arriola is a 59 y.o. female.    Procedures    Patient was assessed and determined by physician to be good candidate for trial of Osteopathic Manipulative Treatment    OSTEOPATHIC MANIPULATIVE TREATMENT    INDICATIONS/COUNSELING:  -Patient verbally consented and agreed to proceed with OMT  -Discussed the following risks & benefits of the procedure: making their MSK pain worse, making no difference to their pain, prolonged soreness after the procedure, and rare risk of vertebral artery dissection from cervical HVLA (if that particular modality used).  -Discussed alternatives to procedure which include:    -continuing rest  -continuing  home stretches  -continuing physical therapy  -waiting to see if pain self-resolves    PHYSICAL EXAM (OSTEOPATHIC):    Head forward posture  Shoulder heights equal     Asis high on right, low left   Psis low right, high left   Standing flexion positive right     Left foot ext rotation     Gait no limp     Piriformis tight left          Innom post rotation right     L2-5 SLRR  Sacrum L on R axis extension    Piriformis tight /restriction left       -Post-treatment assessment:      Innominate, lumbar spine, sacrum, and piriformis all symmetric post treatment     PROCEDURE  -Modalities performed:  [] HVLA  [x] Muscle Energy  [] Still's Technique  [x] Myofascial  [] Joseph Strain-Counterstrain  [x] Facilitated Positional Release  [] Articulatory  [] Balanced Ligamentous Tension    -Regions treated:  [] head[] cervical[] ribs[] thoracic[x] lumbar[x] sacral[x] pelvic    -Pretreatment assessment:      Innom post rotation right     L2-5 SLRR  Sacrum L on R axis extension    Piriformis tight /restriction left       -Post-treatment assessment:      Innominate, lumbar spine, sacrum, and piriformis all symmetric post treatment     -Disposition:  [x] Patient tolerated procedure well with immediate noticeable change in sx.  [x] No immediate complications noted.  [x] Left in stable condition.        Assessment/Plan   Diagnoses and all orders for this visit:  Hip pain, left  Other orders  -     Flu vaccine, trivalent, preservative free, age 6 months and greater (Fluarix/Fluzone/Flulaval)          Follow-up in Jan 2025 for recheck above.  Call for sooner follow-up if needed.       Scribe Attestation  By signing my name below, I, Zaire Jc   attest that this documentation has been prepared under the direction and in the presence of Minda Hutson DO.

## 2024-12-04 ENCOUNTER — APPOINTMENT (OUTPATIENT)
Dept: ALLERGY | Facility: CLINIC | Age: 59
End: 2024-12-04
Payer: COMMERCIAL

## 2025-01-07 ENCOUNTER — APPOINTMENT (OUTPATIENT)
Dept: SURGICAL ONCOLOGY | Facility: CLINIC | Age: 60
End: 2025-01-07
Payer: COMMERCIAL

## 2025-01-28 NOTE — PROGRESS NOTES
"  Subjective   Patient ID:   [id number]   Fatimah Arriola is a 59 y.o. female who presents for Drug Challenge.    Chief Complaint   Patient presents with    Drug Challenge      This is a new patient, with H/O asthma, GERD, precordial catch syndrome, presenting for evaluation of antibiotic reactions/intolerances.    Patient reports a history of antibiotic reactions includin)  Penicillin - 2023 - PCP ordered blood work at  for penicillin, which she states was negative.  She requested testing because of >20 year reaction as a child.  She was treated for asthma, PNA and SOB.     2)  Doxycycline / Bactrim- 2024 - She received Bactrim for 10 days for a cyst but at /C was switched to doxycycline because the cyst persisted.  After 2 days, she broke out in hives and went to the ED where she was given Bactrim 24.  After only one pill, she ended up in the ED 2024 for arm swelling, hives and redness and severe hip and knee pain.  She had no conjunctivitis or oral sores.  The joint pain resolved while she was still in the ED.  She notes having taken several rounds of tetracycline for acne with no problem.               Objective   Ht 1.676 m (5' 6\")   Wt 58.1 kg (128 lb)   BMI 20.66 kg/m²      Physical Exam  Constitutional:       Appearance: Normal appearance.   HENT:      Head: Normocephalic and atraumatic.   Eyes:      Extraocular Movements: Extraocular movements intact.      Conjunctiva/sclera: Conjunctivae normal.      Pupils: Pupils are equal, round, and reactive to light.   Pulmonary:      Effort: Pulmonary effort is normal.   Neurological:      Mental Status: She is alert and oriented to person, place, and time. Mental status is at baseline.   Psychiatric:         Mood and Affect: Mood normal.         Behavior: Behavior normal.         Thought Content: Thought content normal.         Judgment: Judgment normal.     Drug/Vaccine allergy testing was performed on Fatimah Arriola using " standard technique. There were no immediate complications.    Test Results  Controls  Positive Histamine:  (Intradermal histamine 10 x 10)  Intradermal Saline: 0  Comments  Comments: Needs oral challenge  ANTIGEN 1  Drug/Vaccine Name: Penicillin  Concentration/Solution: 10,000 U  Result: Negative    Skin testing to penicillin is negative.    Assessment/Plan     Penicillin adverse reaction  May 2023 - PCP ordered blood work at  for penicillin, which she states was negative.  She requested testing because of >20 year reactions as a child.  She was treated for asthma, PNA and SOB.    Skin testing to penicillin is negative.  Return for OC to amoxicillin and SPT to doxycycline.    Multiple drug allergies  PCP ordered blood work at  for penicillin, which she states was negative.  She requested testing because of >20 year reaction as a child.  She was treated for asthma, PNA and SOB.      Skin testing to penicillin is negative.  Return for oral challenge to amoxicillin and SPT to doxycycline.    No testing to Bactrim. Most likely type 3 reaction.     By signing my name below, I, Sergo Marinelliibjacinta, attest that this documentation has been prepared under the direction and in the presence of Edilia Cleveland MD.  All medical record entries made by the Scribe were at my direction and personally dictated by me. I have reviewed the chart and agree that the record accurately reflects my personal performance of the history, physical exam, discussion and plan.

## 2025-01-29 ENCOUNTER — APPOINTMENT (OUTPATIENT)
Dept: ALLERGY | Facility: CLINIC | Age: 60
End: 2025-01-29
Payer: COMMERCIAL

## 2025-01-29 VITALS — BODY MASS INDEX: 20.57 KG/M2 | WEIGHT: 128 LBS | HEIGHT: 66 IN

## 2025-01-29 DIAGNOSIS — Z88.0 ALLERGY TO PENICILLIN: ICD-10-CM

## 2025-01-29 DIAGNOSIS — T36.0X5A ADVERSE EFFECT OF PENICILLIN, INITIAL ENCOUNTER: Primary | ICD-10-CM

## 2025-01-29 DIAGNOSIS — R06.02 SHORTNESS OF BREATH: ICD-10-CM

## 2025-01-29 DIAGNOSIS — Z88.9 MULTIPLE DRUG ALLERGIES: ICD-10-CM

## 2025-01-29 PROCEDURE — 95024 IQ TESTS W/ALLERGENIC XTRCS: CPT | Performed by: ALLERGY & IMMUNOLOGY

## 2025-01-29 PROCEDURE — 95018 ALL TSTG PERQ&IQ DRUGS/BIOL: CPT | Performed by: ALLERGY & IMMUNOLOGY

## 2025-01-29 PROCEDURE — 99204 OFFICE O/P NEW MOD 45 MIN: CPT | Performed by: ALLERGY & IMMUNOLOGY

## 2025-01-29 NOTE — ASSESSMENT & PLAN NOTE
May 2023 - PCP ordered blood work at  for penicillin, which she states was negative.  She requested testing because of >20 year reactions as a child.  She was treated for asthma, PNA and SOB.    Skin testing to penicillin is negative.  Return for OC to amoxicillin and SPT to doxycycline.

## 2025-01-29 NOTE — LETTER
"2025     Minda Hutson DO  [address]  [facility]  [facility] [address]    Patient: Fatimah Arriola   YOB: 1965   Date of Visit: 2025       Dear Dr. Minda Hutson DO:    Thank you for referring Fatimah Arriola to me for evaluation. Below are my notes for this consultation.  If you have questions, please do not hesitate to call me. I look forward to following your patient along with you.       Sincerely,     Edilia Cleveland MD      CC: No Recipients  ______________________________________________________________________________________      Subjective  Patient ID:   [id number]   Fatimah Arriola is a 59 y.o. female who presents for Drug Challenge.    Chief Complaint   Patient presents with   • Drug Challenge      This is a new patient, with H/O asthma, GERD, precordial catch syndrome, presenting for evaluation of antibiotic reactions/intolerances.    Patient reports a history of antibiotic reactions includin)  Penicillin - 2023 - PCP ordered blood work at  for penicillin, which she states was negative.  She requested testing because of >20 year reaction as a child.  She was treated for asthma, PNA and SOB.     2)  Doxycycline / Bactrim- 2024 - She received Bactrim for 10 days for a cyst but at /C was switched to doxycycline because the cyst persisted.  After 2 days, she broke out in hives and went to the ED where she was given Bactrim 24.  After only one pill, she ended up in the ED 2024 for arm swelling, hives and redness and severe hip and knee pain.  She had no conjunctivitis or oral sores.  The joint pain resolved while she was still in the ED.  She notes having taken several rounds of tetracycline for acne with no problem.               Objective  Ht 1.676 m (5' 6\")   Wt 58.1 kg (128 lb)   BMI 20.66 kg/m²      Physical Exam  Constitutional:       Appearance: Normal appearance.   HENT:      Head: Normocephalic " and atraumatic.   Eyes:      Extraocular Movements: Extraocular movements intact.      Conjunctiva/sclera: Conjunctivae normal.      Pupils: Pupils are equal, round, and reactive to light.   Pulmonary:      Effort: Pulmonary effort is normal.   Neurological:      Mental Status: She is alert and oriented to person, place, and time. Mental status is at baseline.   Psychiatric:         Mood and Affect: Mood normal.         Behavior: Behavior normal.         Thought Content: Thought content normal.         Judgment: Judgment normal.     Drug/Vaccine allergy testing was performed on [employer] using standard technique. There were no immediate complications.    Test Results  Controls  Positive Histamine:  (Intradermal histamine 10 x 10)  Intradermal Saline: 0  Comments  Comments: Needs oral challenge  ANTIGEN 1  Drug/Vaccine Name: Penicillin  Concentration/Solution: 10,000 U  Result: Negative    Skin testing to penicillin is negative.    Assessment/Plan    Penicillin adverse reaction  May 2023 - PCP ordered blood work at  for penicillin, which she states was negative.  She requested testing because of >20 year reactions as a child.  She was treated for asthma, PNA and SOB.    Skin testing to penicillin is negative.  Return for OC to amoxicillin and SPT to doxycycline.    Multiple drug allergies  PCP ordered blood work at  for penicillin, which she states was negative.  She requested testing because of >20 year reaction as a child.  She was treated for asthma, PNA and SOB.      Skin testing to penicillin is negative.  Return for oral challenge to amoxicillin and SPT to doxycycline.    No testing to Bactrim. Most likely type 3 reaction.     By signing my name below, I, Zaire Marinelli, attest that this documentation has been prepared under the direction and in the presence of Edilia Cleveland MD.  All medical record entries made by the Scribe were at my direction and personally dictated by me. I have  reviewed the chart and agree that the record accurately reflects my personal performance of the history, physical exam, discussion and plan.

## 2025-01-29 NOTE — PATIENT INSTRUCTIONS
Preliminary read of x-ray shows possible fracture to the foot.  We will wait for radiology official report.  If fracture is identified by the radiologist we will send an orthopedic referral.   Recommend to wear boot and crutches  Rest until you feel better  Apply ice for 10-15 minutes a few times a day  Use Ace wrap for compression  Elevate the extremity as often as she can  Call or seek immediate medical attention if you develop worsening pain, any new or worrisome symptoms that arise at home, or if you do not get better as expected   Skin testing to penicillin is negative.  Return for oral challenge to amoxicillin and skin prick testing to doxcycyline.  Be sure to avoid all antihistamines 5 days before testing.  Testing will take approximately 2 hours.

## 2025-01-29 NOTE — ASSESSMENT & PLAN NOTE
PCP ordered blood work at  for penicillin, which she states was negative.  She requested testing because of >20 year reaction as a child.  She was treated for asthma, PNA and SOB.      Skin testing to penicillin is negative.  Return for oral challenge to amoxicillin and SPT to doxycycline.    No testing to Bactrim. Most likely type 3 reaction.

## 2025-03-12 ENCOUNTER — APPOINTMENT (OUTPATIENT)
Dept: ALLERGY | Facility: CLINIC | Age: 60
End: 2025-03-12
Payer: COMMERCIAL

## 2025-03-12 PROCEDURE — RXMED WILLOW AMBULATORY MEDICATION CHARGE

## 2025-03-19 ENCOUNTER — PHARMACY VISIT (OUTPATIENT)
Dept: PHARMACY | Facility: CLINIC | Age: 60
End: 2025-03-19
Payer: COMMERCIAL

## 2025-03-19 ENCOUNTER — APPOINTMENT (OUTPATIENT)
Dept: ALLERGY | Facility: CLINIC | Age: 60
End: 2025-03-19
Payer: COMMERCIAL

## 2025-03-26 ENCOUNTER — APPOINTMENT (OUTPATIENT)
Dept: ALLERGY | Facility: CLINIC | Age: 60
End: 2025-03-26
Payer: COMMERCIAL

## 2025-04-09 ENCOUNTER — EVALUATION (OUTPATIENT)
Dept: PHYSICAL THERAPY | Facility: CLINIC | Age: 60
End: 2025-04-09
Payer: COMMERCIAL

## 2025-04-09 DIAGNOSIS — M76.62 TENDONITIS, ACHILLES, LEFT: Primary | ICD-10-CM

## 2025-04-09 PROCEDURE — 97140 MANUAL THERAPY 1/> REGIONS: CPT | Mod: GP | Performed by: PHYSICAL THERAPIST

## 2025-04-09 PROCEDURE — 97161 PT EVAL LOW COMPLEX 20 MIN: CPT | Mod: GP | Performed by: PHYSICAL THERAPIST

## 2025-04-09 ASSESSMENT — PATIENT HEALTH QUESTIONNAIRE - PHQ9
SUM OF ALL RESPONSES TO PHQ9 QUESTIONS 1 AND 2: 0
1. LITTLE INTEREST OR PLEASURE IN DOING THINGS: NOT AT ALL
2. FEELING DOWN, DEPRESSED OR HOPELESS: NOT AT ALL

## 2025-04-09 ASSESSMENT — ENCOUNTER SYMPTOMS
DEPRESSION: 0
OCCASIONAL FEELINGS OF UNSTEADINESS: 0
LOSS OF SENSATION IN FEET: 0

## 2025-04-09 NOTE — PROGRESS NOTES
Physical Therapy Evaluation    Patient Name: Fatimah Arriola  MRN: [id number]  Today's Date: 4/9/2025  Visit: 1/90  DOS/DOI:  3/9/25  Referred by:   Minda Saldivar  Time Calculation  Start Time: 1053  Stop Time: 1140  Time Calculation (min): 47 min  PT Evaluation Time Entry  PT Evaluation (Low) Time Entry: 30  PT Therapeutic Procedures Time Entry  Manual Therapy Time Entry: 10  Therapeutic Exercise Time Entry: 7                 Diagnosis:   1. Tendonitis, Achilles, left  Follow Up In Physical Therapy                PMH  - asthma    HPI  She was on a hike in [place] and stepped on a rock with her midfoot causing her heel to drop down.  She felt a pull in the heel area.  She was able to hike again but had to avoid certain movement.  The pain improved but did not fully resolve at the L heel.  She went to her ortho MD at [facility].  She was given exercises and meds and was referred to PT.  No imaging done at this time.    Precautions  None    SUBJECTIVE  Symptoms:  - she reports pain at the distal heel with tightness into the achilles with numbness around the insertion.  It is rated 1/10, but can increase to 3-4/10.  It increases when the foot is in a position that stretches the achilles or when doing quick movements.  It decreases with stretching.  Functionally she is limited with stairs, standing up from a chair quickly and walking for exercise (3-4 miles/day).    Patient's Living Environment:  - lives with   - 2 story home.  2nd floor bedroom    Previous Level of Function:  - no limitations prior to 3/9/25    Patient's Therapy Goals:  - ensure she is doing exercises correctly  - make sure she is on the path to recovery    OBJECTIVE  Observation:    - midfoot pro B    Palpation:   - pain at L achilles and at lat achilles insertion    AROM:  - L ankle DF = 2, PF = 55, INV = 30, EVR = 25    MMT:  - L ankle= 5/5 all dir    Special Tests:  - Positive WB rot B, squat (lacks ankle DF), talar swing (for decreased motion)  -  Negative calf raise, talocrual/subtalar ligaments    Joint Mobility:   - decreased DF    Outcome Measure:  - LEFS = 51    ASSESSMENT  The patient is a 58 y/o female presenting to PT with L achilles tendonitis.  She lacks joint mobility and AROM into DF.  This limits her ability to walk for exercise, squatting, transfer and do stairs.  She will benefit from PT to address these deficits for return to all ADLS.    Is skilled care required:  yes  Rehab potential:  good  Clinical presentation:  Stable and/or uncomplicated characteristics  Complexity:   . Low complexity due to patient's clinical presentation being stable and uncomplicated by any significant comorbidities that may affect rehab tolerance and progression.       Personal factors effecting care:  none    PLAN  Frequency/duration:  1x/wk x 6 wks  Planned Interventions:  MT, therapeutic exercise, neuro re-ed, modalities prn  Patient/caregiver agrees with POC:  yes    TODAY'S TREATMENT  - evaluation of the L ankle completed  - Manual therapy:  DF mobs.  DF = 4 degrees after this rx  - she was given a Hep as seen below:  Access Code: [email or web address]  URL: [email or web address]  Date: 04/09/2025  Prepared by: Becky Drake    Exercises  - Seated Hamstring Stretch  - 1 x daily - 7 x weekly - 1 sets - 3 reps - 30 sec hold  - Long Sitting Calf Stretch with Strap  - 1 x daily - 7 x weekly - 1 sets - 3 reps - 30 sec  hold  - Soleus Stretch on Wall  - 1 x daily - 7 x weekly - 1 sets - 3 reps - 30 sec hold  - Standing Dorsiflexion Self-Mobilization on Step  - 1 x daily - 7 x weekly - 3 sets - 3 reps - 30 sec hold    Goals:   Active       PT Problem       PT Goal 1:  she will be independent with her HEP       Start:  04/09/25    Expected End:  04/16/25            PT Goal 2:  achieve full active DF to facilitate normal gait and allow her to squat       Start:  04/09/25    Expected End:  04/30/25            PT Goal 3:  demonstrate decreased inflammation by  having no pain with palpation       Start:  04/09/25    Expected End:  04/23/25            PT Goal 4:  perform all transfers without pain       Start:  04/09/25    Expected End:  05/07/25            PT Goal 5:  return to hiking for exercise without pain       Start:  04/09/25    Expected End:  05/21/25

## 2025-04-22 ENCOUNTER — TREATMENT (OUTPATIENT)
Dept: PHYSICAL THERAPY | Facility: CLINIC | Age: 60
End: 2025-04-22
Payer: COMMERCIAL

## 2025-04-22 DIAGNOSIS — M76.62 TENDONITIS, ACHILLES, LEFT: ICD-10-CM

## 2025-04-22 PROCEDURE — 97110 THERAPEUTIC EXERCISES: CPT | Mod: GP | Performed by: PHYSICAL THERAPIST

## 2025-04-22 NOTE — PROGRESS NOTES
Physical Therapy Treatment    Patient Name: Fatimah Arriola  MRN: [id number]  Today's Date: 4/22/2025  Visit 2/90  DOS/DOI:  3/9/25  Referred by:   Minda Saldivar  Time Calculation  Start Time: 1219  Stop Time: 1306  Time Calculation (min): 47 min     PT Therapeutic Procedures Time Entry  Manual Therapy Time Entry: 7  Therapeutic Exercise Time Entry: 30  PT Modalities Time Entry  Hot/Cold Pack Time Entry: 10              Diagnosis:   1. Tendonitis, Achilles, left  Follow Up In Physical Therapy            PRECAUTIONS:  - asthma    SUBJECTIVE:  She has been feeling better.  She reports 1/10 pain intermittently.  It occurs at the lateral heel vs at the achilles.  HEP compliance: yes    OBJECTIVE:  - tender at achilles    Treatment:  - initiated exercise for proprioception, stretching, stability and strength.  Therapeutic Exercise  Therapeutic Exercise Activity 1: slant board stretch, 30 sec x3  Therapeutic Exercise Activity 2: L SLS on airex 30 sec x2  Therapeutic Exercise Activity 3: 1/2 roll balance, flat side up, front/back, 1 min  Therapeutic Exercise Activity 4: NuStep L1, 5 min  Therapeutic Exercise Activity 5: ankle 4-way, GN TB,  x10  Manual Therapy  Manual Therapy Activity 1: STM to L achilles and heel        Modalities  Modality 1: Untimed Cold Pack (CP to L heel/achilles, 10 min, supine, after exercise)    ASSESSMENT:  Son rx well - some pain during resisted plantarflexion but otherwise no symptoms.  She dagmar progressing well and will benefit from continued therapy to further improve strength of the L gastroc for return to all ADLS    PLAN:  Continue with exercise for calf strengthening.

## 2025-04-29 ENCOUNTER — TREATMENT (OUTPATIENT)
Dept: PHYSICAL THERAPY | Facility: CLINIC | Age: 60
End: 2025-04-29
Payer: COMMERCIAL

## 2025-04-29 DIAGNOSIS — M76.62 TENDONITIS, ACHILLES, LEFT: ICD-10-CM

## 2025-04-29 PROCEDURE — 97110 THERAPEUTIC EXERCISES: CPT | Mod: GP | Performed by: PHYSICAL THERAPIST

## 2025-04-29 NOTE — PROGRESS NOTES
Physical Therapy Treatment    Patient Name: Fatimah Arriola  MRN: [id number]  Today's Date: 4/29/2025  Visit 3/90  DOS/DOI:  3/9/25  Referred by:   Minda Saldivar  Time Calculation  Start Time: 1140  Stop Time: 1225  Time Calculation (min): 45 min     PT Therapeutic Procedures Time Entry  Manual Therapy Time Entry: 7  Therapeutic Exercise Time Entry: 38                 Diagnosis:   1. Tendonitis, Achilles, left  Follow Up In Physical Therapy            PRECAUTIONS:  - asthma    SUBJECTIVE:  She went on a walk for more than 3 miles on a flat surface.  She had no pain during after but was pretty sore afterward (stiff in the calf).  She feels she is 85% better.    She can do a squat now.  Today she reports 1/10 pain at the post and lateral L heel.  Her highest pain level is 2/10    HEP compliance: yes    OBJECTIVE:  - amb with normal gait.    - able to B calf raise  - some tenderness at the L achilles insertion    Treatment:  - continued with exercise for strength.  Advanced exercise in WB  Therapeutic Exercise  Therapeutic Exercise Activity 1: slant board stretch, 30 sec x3  Therapeutic Exercise Activity 2: L SLS on airex 30 sec x2  Therapeutic Exercise Activity 3: 1/2 roll balance, flat side up, front/back, 1 min  Therapeutic Exercise Activity 4: NuStep L1, 5 min  Therapeutic Exercise Activity 5: ankle 4-way, GN TB, x10  Therapeutic Exercise Activity 6: steamboots L SLS, GN TB 2x10 all dir  Therapeutic Exercise Activity 7: calf raise, 2x10  Therapeutic Exercise Activity 8: ankle pump, standing on 1/2 roll, flat side up 2x10  Manual Therapy  Manual Therapy Activity 1: STM to L achilles and heel             ASSESSMENT:  Nilsa rx well.  She does report some achilles discomfort and increased effort at the L LE with new exercises.  Overall she is progressing well and will benefit from continued therapy to further increase strength and function.    PLAN:  Increase reps as nilsa.

## 2025-05-06 ENCOUNTER — TREATMENT (OUTPATIENT)
Dept: PHYSICAL THERAPY | Facility: CLINIC | Age: 60
End: 2025-05-06
Payer: COMMERCIAL

## 2025-05-06 DIAGNOSIS — M76.62 TENDONITIS, ACHILLES, LEFT: ICD-10-CM

## 2025-05-06 PROCEDURE — 97110 THERAPEUTIC EXERCISES: CPT | Mod: GP | Performed by: PHYSICAL THERAPIST

## 2025-05-06 PROCEDURE — 97140 MANUAL THERAPY 1/> REGIONS: CPT | Mod: GP | Performed by: PHYSICAL THERAPIST

## 2025-05-06 NOTE — PROGRESS NOTES
Physical Therapy Treatment    Patient Name: Fatimah Arriola  MRN: [id number]  Today's Date: 5/6/2025  Visit 4/90  DOS/DOI:  3/9/25  Referred by:   Minda Saldivar  Time Calculation  Start Time: 1225  Stop Time: 1310  Time Calculation (min): 45 min     PT Therapeutic Procedures Time Entry  Manual Therapy Time Entry: 14  Therapeutic Exercise Time Entry: 31                 Diagnosis:   1. Tendonitis, Achilles, left  Follow Up In Physical Therapy            PRECAUTIONS:  - asthma    SUBJECTIVE:  She did some hiking over the weekend and did well.  If she does a lot of walking she will have 1/10 soreness at the lateral and post heel.  She doesn't really have any symptoms in her calf now.  She did have some pain after last rx and needed to ice.    HEP compliance: yes    OBJECTIVE:  - amb with normal gait  - tender at L achilles insert only    Treatment:  - continues with strength exercise of the L   Therapeutic Exercise  Therapeutic Exercise Activity 1: slant board stretch, 30 sec x3  Therapeutic Exercise Activity 2: L SLS on airex 30 sec x2  Therapeutic Exercise Activity 3: ankle pump, standing on 1/2 roll, flat side up 2x10  Therapeutic Exercise Activity 4: NuStep L1, 5 min  Therapeutic Exercise Activity 5: ankle 4-way, GN TB, x10  Therapeutic Exercise Activity 6: steamboots L SLS, GN TB 2x10 all dir  Therapeutic Exercise Activity 7: calf raise, 3x10  Manual Therapy  Manual Therapy Activity 1: STM to L achilles and heel             ASSESSMENT:  Son rx well.  She is progressing well and has begun to  do a little hiking.  No problem with today's exercise.  She will benefit from continued therapy to start more athletic movements requiring push off and quick motions.    PLAN:  Add quick step up/down on 2 inch ledge

## 2025-05-12 PROBLEM — L50.9 HIVES: Status: ACTIVE | Noted: 2025-05-12

## 2025-05-13 ENCOUNTER — TREATMENT (OUTPATIENT)
Dept: PHYSICAL THERAPY | Facility: CLINIC | Age: 60
End: 2025-05-13
Payer: COMMERCIAL

## 2025-05-13 DIAGNOSIS — M76.62 TENDONITIS, ACHILLES, LEFT: ICD-10-CM

## 2025-05-13 PROCEDURE — 97110 THERAPEUTIC EXERCISES: CPT | Mod: GP | Performed by: PHYSICAL THERAPIST

## 2025-05-13 NOTE — PROGRESS NOTES
Physical Therapy Treatment    Patient Name: Fatimah Arriola  MRN: [id number]  Today's Date: 5/13/2025  Visit 5/90  DOS/DOI:  3/9/25  Referred by:   Minda Saldivar  Time Calculation  Start Time: 1450  Stop Time: 1530  Time Calculation (min): 40 min     PT Therapeutic Procedures Time Entry  Therapeutic Exercise Time Entry: 40                 Diagnosis:   1. Tendonitis, Achilles, left  Follow Up In Physical Therapy            PRECAUTIONS:  - asthma    SUBJECTIVE:  - 0/10 pain.  The post/lat aspect of L heel gets tender after walking a 2 miles.  She is cautious on uneven ground.    HEP compliance: yes    OBJECTIVE:  - L ankle AROM DF = 10, PF = 52, INV = 30, EVR = 18  - L ankle MMT = 5/5 all dir  - amb with normal gait  - no pain with palpation of achilles    Treatment:  - initiated plyometric and agility exercise to work toward return to more athletic activity.  Therapeutic Exercise  Therapeutic Exercise Activity 1: slant board stretch, 30 sec x3  Therapeutic Exercise Activity 2: L SLS on airex, RD ball toss, forward/ B lateral x  Therapeutic Exercise Activity 3: forward/lateral hops 2 x10  Therapeutic Exercise Activity 4: NuStep L1, 5 min  Therapeutic Exercise Activity 5: forward/lateral hop  Therapeutic Exercise Activity 6: steamboots L SLS, GN TB 2x10 all dir  Therapeutic Exercise Activity 7: L SLS calf raise 2x12  Therapeutic Exercise Activity 8: quick step on/off 2 in ledge, 2 x 1 min  Therapeutic Exercise Activity 9: ankle 4-way, GN TB, 2x10                ASSESSMENT:  Son rx well - needs cues during jumps as she moves into genu valgum B upon landing and push off.  She will benefit from continued therapy to further work on agility and power.  Anticipate discharge soon due to her improvement.    Active       PT Problem       PT Goal 1:  she will be independent with her HEP (Met)       Start:  04/09/25    Expected End:  04/16/25    Resolved:  05/13/25         PT Goal 2:  achieve full active DF to facilitate normal  gait and allow her to squat (Met)       Start:  04/09/25    Expected End:  04/30/25    Resolved:  05/13/25         PT Goal 3:  demonstrate decreased inflammation by having no pain with palpation (Met)       Start:  04/09/25    Expected End:  04/23/25    Resolved:  05/13/25         PT Goal 4:  perform all transfers without pain (Met)       Start:  04/09/25    Expected End:  05/07/25    Resolved:  05/13/25         PT Goal 5:  return to hiking for exercise without pain       Start:  04/09/25    Expected End:  05/21/25              PLAN:  Add ladder drills

## 2025-05-14 ENCOUNTER — APPOINTMENT (OUTPATIENT)
Dept: ALLERGY | Facility: CLINIC | Age: 60
End: 2025-05-14
Payer: COMMERCIAL

## 2025-05-28 ENCOUNTER — TREATMENT (OUTPATIENT)
Dept: PHYSICAL THERAPY | Facility: CLINIC | Age: 60
End: 2025-05-28
Payer: COMMERCIAL

## 2025-05-28 DIAGNOSIS — M76.62 TENDONITIS, ACHILLES, LEFT: ICD-10-CM

## 2025-05-28 PROCEDURE — 97110 THERAPEUTIC EXERCISES: CPT | Mod: GP | Performed by: PHYSICAL THERAPIST

## 2025-05-28 NOTE — PROGRESS NOTES
Physical Therapy Treatment/Discharge    Patient Name: Fatimah Arriola  MRN: [id number]  Today's Date: 5/28/2025  Visit 6/90  DOS/DOI:  3/9/25  Referred by:   Minda Saldivar  Time Calculation  Start Time: 1231  Stop Time: 1321  Time Calculation (min): 50 min     PT Therapeutic Procedures Time Entry  Therapeutic Exercise Time Entry: 50                 Diagnosis:   1. Tendonitis, Achilles, left  Follow Up In Physical Therapy            PRECAUTIONS:  - asthma    SUBJECTIVE:  She has been pain free since her last rx.  She continues to walk for exercise and can go 3.5 miles.  No limits with ADLs.  HEP compliance: yes    OBJECTIVE:  - amb with normal gait  - able to L SLS calf raise x10  - AROM L ankle = WFL all dir  - MMT L ankle = 5/5 all dir without pain  - LEFS = 79 (51 at eval)    Treatment:  - added agility drills for ankle stability and proprioception at various angles and during push off/landing.    Therapeutic Exercise  Therapeutic Exercise Activity 1: slant board stretch, 30 sec x3  Therapeutic Exercise Activity 3: forward/lateral hops 2 x10  Therapeutic Exercise Activity 4: NuStep L1, 5 min  Therapeutic Exercise Activity 5: lateral shuffle x6  Therapeutic Exercise Activity 8: quick step on/off 2 in ledge, 2 x 1 min  Therapeutic Exercise Activity 10: ladder drill:  in/out x 4, carioca x4, 2 up/1 back x4, jump on the edge x4  Therapeutic Exercise Activity 11: clock jumps x3  Therapeutic Exercise Activity 12: step stone: forward/lateral walk x4              - she was given agility exercises to continue independently as seen below:    Access Code: [email or web address]  URL: [email or web address]  Date: 05/28/2025  Prepared by: Becky Drake    Exercises  - Carioca with Agility Ladder  - 1 x daily - 4 x weekly - 3 sets - 10 reps  - Forward Shuffle- Two Feet In, Two Feet Out with Agility Ladder  - 1 x daily - 4 x weekly - 3 sets - 10 reps  - Side Straddle Hop with Agility Ladder  - 1 x daily - 4 x weekly -  3 sets - 10 reps  - Single Leg Jumps Forward and Backward  - 1 x daily - 4 x weekly - 3 sets - 10 reps  - Sideways Tape Jumps  - 1 x daily - 4 x weekly - 3 sets - 10 reps  - Jumping Jacks  - 1 x daily - 4 x weekly - 3 sets - 10 reps    ASSESSMENT:  Son rx well -  No ankle/achilles symptoms with any activity today.  Continues to demonstrate genu valgum with jumps intermittently but much improved with cueing to engage gluts.   She has no pain and has been able to do light agility and plyometric drills as well as walk 3.5 miles without symptoms.  She has met all goals and is able to continue independently with a Hep at this time.    Resolved       PT Problem       PT Goal 1:  she will be independent with her HEP (Met)       Start:  04/09/25    Expected End:  04/16/25    Resolved:  05/13/25         PT Goal 2:  achieve full active DF to facilitate normal gait and allow her to squat (Met)       Start:  04/09/25    Expected End:  04/30/25    Resolved:  05/13/25         PT Goal 3:  demonstrate decreased inflammation by having no pain with palpation (Met)       Start:  04/09/25    Expected End:  04/23/25    Resolved:  05/13/25         PT Goal 4:  perform all transfers without pain (Met)       Start:  04/09/25    Expected End:  05/07/25    Resolved:  05/13/25         PT Goal 5:  return to hiking for exercise without pain (Met)       Start:  04/09/25    Expected End:  05/21/25    Resolved:  05/28/25           PLAN:  Discharge to [facility]

## 2025-06-10 NOTE — PROGRESS NOTES
Subjective   Patient ID:   [id number]   Fatimah Arriola is a 59 y.o. female who presents for Drug Challenge (OC to amoxicillin and SPT to doxycycline ).    Chief Complaint   Patient presents with    Drug Challenge     OC to amoxicillin and SPT to doxycycline       Patient reports a history of antibiotic reactions includin)  Penicillin - 2023 - PCP ordered blood work at  for penicillin, which she states was negative.  She requested testing because of >20 year reaction as a child.  She was treated for asthma, PNA and SOB.     2)  Doxycycline / Bactrim- 2024 - She received Bactrim for 10 days for a cyst but at / was switched to doxycycline because the cyst persisted.  After 2 days, she broke out in hives and went to the ED where she was given Bactrim 24.  After only one pill, she ended up in the ED 2024 for arm swelling, hives, redness and severe hip and knee pain.  She had no conjunctivitis or oral sores.  The joint pain resolved while she was still in the ED.  She notes having taken several rounds of tetracycline for acne with no problem.    At last visit, 25, patient had negative skin testing to penicillin.  She will undergo oral challenge to amoxicillin today as well as skin testing to doxycycline.     Patient states her daughter also broke out in hives after having sulfa.    Objective   /60   Pulse 70   Wt 58.1 kg (128 lb)   SpO2 99%   BMI 20.66 kg/m²      Physical Exam  Constitutional:       Appearance: Normal appearance.   HENT:      Head: Normocephalic and atraumatic.      Right Ear: External ear normal. There is no impacted cerumen.      Left Ear: External ear normal. There is no impacted cerumen.      Nose: No congestion or rhinorrhea.   Eyes:      Extraocular Movements: Extraocular movements intact.      Conjunctiva/sclera: Conjunctivae normal.      Pupils: Pupils are equal, round, and reactive to light.   Cardiovascular:      Rate and Rhythm: Normal rate and  regular rhythm.      Heart sounds: No murmur heard.     No friction rub. No gallop.   Pulmonary:      Effort: No respiratory distress.      Breath sounds: No wheezing, rhonchi or rales.   Skin:     General: Skin is warm and dry.   Neurological:      Mental Status: She is alert.   Psychiatric:         Mood and Affect: Mood normal.         Behavior: Behavior normal.     There was a direct correlation between the amoxicillin and the rash and the drug.  Presumed allergen cannot be easily or safely avoided.     Challenge testing was performed on Fatimah R Arch using standard technique. There were no immediate complications.    Test Results  Challenge  Type of Challenge: Amoxicillin  Goal Dose: 500 mg  Vital Signs Reviewed Prior to Challenge: Yes  1)  Time: 0819  Dose: 500 mg  Reaction / Comment:  (BP: 100/68 P: 65 POX 99%)  2)  Time: 0930  Reaction / Comment: Discharged stable  Result  Pass: Yes  Reacted: No    Oral challenge to amoxicillin is negative.    Drug/Vaccine allergy testing was performed on Fatimah R Arch using standard technique. There were no immediate complications.    Test Results  Controls  Negative Saline: 0  Positive Histamine: 4 x 4 (Intradermal histamine 10 x 10)  Intradermal Saline: 0  Comments  Comments: Needs oral challenge.  ANTIGEN 1  Test Type: SPT  Drug/Vaccine Name: Doxycycline  Concentration/Solution: 0.2 mg/mL  Result: Negative  ANTIGEN 2  Test Type: SPT  Drug/Vaccine Name: Doxycycline  Concentration/Solution: 2 mg/mL  Result: Negative  ANTIGEN 3  Test Type: SPT  Drug/Vaccine Name: Doxycycline  Concentration/Solution: 20 mg/mL  Result: Negative  ANTIGEN 4  Test Type: ID  Drug/Vaccine Name: Doxycycline  Concentration/Solution: 0.2 mg/mL  Result: Negative  ANTIGEN 5  Test Type: ID  Drug/Vaccine Name: Doxycycline  Concentration/Solution: 2 mg/mL  Result: Negative  ANTIGEN 6  Test Type: ID  Drug/Vaccine Name: Doxycycline  Concentration/Solution: 20 mg/mL  Result: Negative    Skin testing to  doxycycline is negative.    Assessment/Plan     Multiple drug allergies  Oral challenge to amoxicillin is negative.  May take penicillin products.    Skin testing to doxycycline is negative.  Return for oral challenge.    Hives  Doxycycline/Bactrim-  7/24/2024 - Received Bactrim for 10 days for a cyst but at / was switched to doxycycline because the cyst persisted.  After 2 days, she broke out in [facility] and went to the ED where she was given Bactrim 7-23-24.  After only one pill, she ended up in the ED July 2024 for arm swelling, hives, redness and severe hip and knee pain.  No conjunctivitis or oral sores.  Joint pain resolved while still in the ED.  She has had several rounds of tetracycline for acne with no problem.    Skin testing to doxycycline is negative.  She will avoid until she returns for oral challenge.    Penicillin adverse reaction  Oral challenge to amoxicillin is negative.  She will let her pharmacist know she may safely take this.     By signing my name below, I, Zaire Marinelli, attest that this documentation has been prepared under the direction and in the presence of Edilia Cleveland MD.  All medical record entries made by the [facility] were at my direction and personally dictated by me. I have reviewed the chart and agree that the record accurately reflects my personal performance of the history, physical exam, discussion and plan.

## 2025-06-11 ENCOUNTER — APPOINTMENT (OUTPATIENT)
Dept: ALLERGY | Facility: CLINIC | Age: 60
End: 2025-06-11
Payer: COMMERCIAL

## 2025-06-11 VITALS
HEART RATE: 70 BPM | BODY MASS INDEX: 20.66 KG/M2 | OXYGEN SATURATION: 99 % | WEIGHT: 128 LBS | DIASTOLIC BLOOD PRESSURE: 60 MMHG | SYSTOLIC BLOOD PRESSURE: 100 MMHG

## 2025-06-11 DIAGNOSIS — Z88.9 MULTIPLE DRUG ALLERGIES: ICD-10-CM

## 2025-06-11 DIAGNOSIS — T36.0X5D ADVERSE EFFECT OF PENICILLIN, SUBSEQUENT ENCOUNTER: Primary | ICD-10-CM

## 2025-06-11 DIAGNOSIS — L50.9 HIVES: ICD-10-CM

## 2025-06-11 PROCEDURE — 95018 ALL TSTG PERQ&IQ DRUGS/BIOL: CPT | Performed by: ALLERGY & IMMUNOLOGY

## 2025-06-11 PROCEDURE — 95076 INGEST CHALLENGE INI 120 MIN: CPT | Performed by: ALLERGY & IMMUNOLOGY

## 2025-06-11 PROCEDURE — 1036F TOBACCO NON-USER: CPT | Performed by: ALLERGY & IMMUNOLOGY

## 2025-06-11 PROCEDURE — 95024 IQ TESTS W/ALLERGENIC XTRCS: CPT | Performed by: ALLERGY & IMMUNOLOGY

## 2025-06-11 PROCEDURE — 95004 PERQ TESTS W/ALRGNC XTRCS: CPT | Performed by: ALLERGY & IMMUNOLOGY

## 2025-06-11 NOTE — ASSESSMENT & PLAN NOTE
Doxycycline/Bactrim-  7/24/2024 - Received Bactrim for 10 days for a cyst but at / was switched to doxycycline because the cyst persisted.  After 2 days, she broke out in hives and went to the ED where she was given Bactrim 7-23-24.  After only one pill, she ended up in the ED July 2024 for arm swelling, hives, redness and severe hip and knee pain.  No conjunctivitis or oral sores.  Joint pain resolved while still in the ED.  She has had several rounds of tetracycline for acne with no problem.    Skin testing to doxycycline is negative.  She will avoid until she returns for oral challenge.

## 2025-06-11 NOTE — ASSESSMENT & PLAN NOTE
Oral challenge to amoxicillin is negative.  May take penicillin products.    Skin testing to doxycycline is negative.  Return for oral challenge.

## 2025-06-11 NOTE — PATIENT INSTRUCTIONS
Oral challenge to amoxicillin is negative.  Let your pharmacist know you may safely take amoxicillin and penicillin products.     Skin testing to doxycycline is negative.  Return for oral challenge.  Be sure to avoid all antihistamines 5 days before testing.  Bring in the medication with you.  Testing will take approximately 2-3 hours.

## 2025-06-11 NOTE — ASSESSMENT & PLAN NOTE
Oral challenge to amoxicillin is negative.  She will let her pharmacist know she may safely take this.

## 2025-10-01 ENCOUNTER — APPOINTMENT (OUTPATIENT)
Dept: ALLERGY | Facility: CLINIC | Age: 60
End: 2025-10-01
Payer: COMMERCIAL

## 2025-10-16 ENCOUNTER — APPOINTMENT (OUTPATIENT)
Dept: RADIOLOGY | Facility: CLINIC | Age: 60
End: 2025-10-16
Payer: COMMERCIAL